# Patient Record
Sex: FEMALE | Race: WHITE | NOT HISPANIC OR LATINO | Employment: OTHER | URBAN - NONMETROPOLITAN AREA
[De-identification: names, ages, dates, MRNs, and addresses within clinical notes are randomized per-mention and may not be internally consistent; named-entity substitution may affect disease eponyms.]

---

## 2017-01-11 ENCOUNTER — CLINICAL SUPPORT (OUTPATIENT)
Dept: FAMILY MEDICINE CLINIC | Facility: CLINIC | Age: 82
End: 2017-01-11

## 2017-01-11 DIAGNOSIS — E53.8 B12 DEFICIENCY: Primary | ICD-10-CM

## 2017-01-11 PROCEDURE — 96372 THER/PROPH/DIAG INJ SC/IM: CPT | Performed by: INTERNAL MEDICINE

## 2017-01-11 RX ADMIN — CYANOCOBALAMIN 1000 MCG: 1000 INJECTION, SOLUTION INTRAMUSCULAR; SUBCUTANEOUS at 14:26

## 2017-01-16 RX ORDER — CYANOCOBALAMIN 1000 UG/ML
1000 INJECTION, SOLUTION INTRAMUSCULAR; SUBCUTANEOUS
Status: SHIPPED | OUTPATIENT
Start: 2017-01-11

## 2017-02-16 ENCOUNTER — CLINICAL SUPPORT (OUTPATIENT)
Dept: CARDIOLOGY | Facility: CLINIC | Age: 82
End: 2017-02-16

## 2017-02-16 VITALS
HEART RATE: 89 BPM | BODY MASS INDEX: 27.28 KG/M2 | SYSTOLIC BLOOD PRESSURE: 138 MMHG | DIASTOLIC BLOOD PRESSURE: 72 MMHG | OXYGEN SATURATION: 99 % | WEIGHT: 169 LBS

## 2017-02-16 DIAGNOSIS — Z95.0 PRESENCE OF PERMANENT CARDIAC PACEMAKER: ICD-10-CM

## 2017-02-16 DIAGNOSIS — I49.5 SICK SINUS SYNDROME (HCC): Primary | ICD-10-CM

## 2017-02-16 PROCEDURE — 93288 INTERROG EVL PM/LDLS PM IP: CPT | Performed by: NURSE PRACTITIONER

## 2017-02-16 NOTE — PROGRESS NOTES
Pacemaker Evaluation Report    February 16, 2017    Primary Cardiologist: Lesly Enriquez MD  Implanting MD: Lesly Enriquez MD  :ThinkSmart Model: SESR01 Serial Number: [id number]  Implant date: 05/26/2015    Reason for evaluation:routine  Cardiac device indication(s):sinus node dysfunction    Battery  JESSE: 7 - 10 years    Interrogation Results  Ventricular sensing: R wave: 8.0 - 16.0 mV  Ventricular capture: 0.5 V @ 0.4 ms  Ventricular lead impedance: right  621 ohms    Parameters  Mode: VVIR  Base Rate: 60 bpm    Diagnostic Data  Ventricular paced: 74.3 %    Changes made: N/A  Conclusions: normal pacemaker function    Assessment:  1. Sick sinus syndrome    2. Presence of permanent cardiac pacemaker

## 2017-03-06 DIAGNOSIS — Z12.31 VISIT FOR SCREENING MAMMOGRAM: Primary | ICD-10-CM

## 2017-03-07 RX ORDER — MELOXICAM 7.5 MG/1
15 TABLET ORAL DAILY
Qty: 60 TABLET | Refills: 5 | Status: SHIPPED | OUTPATIENT
Start: 2017-03-07 | End: 2017-09-19 | Stop reason: SDUPTHER

## 2017-03-07 RX ORDER — SERTRALINE HYDROCHLORIDE 100 MG/1
100 TABLET, FILM COATED ORAL DAILY
Qty: 30 TABLET | Refills: 5 | Status: SHIPPED | OUTPATIENT
Start: 2017-03-07 | End: 2017-09-19 | Stop reason: SDUPTHER

## 2017-03-07 RX ORDER — RANITIDINE 150 MG/1
150 CAPSULE ORAL 2 TIMES DAILY
Qty: 60 CAPSULE | Refills: 5 | Status: SHIPPED | OUTPATIENT
Start: 2017-03-07 | End: 2017-09-19 | Stop reason: SDUPTHER

## 2017-03-07 RX ORDER — PRAVASTATIN SODIUM 40 MG
40 TABLET ORAL DAILY
Qty: 30 TABLET | Refills: 4 | Status: SHIPPED | OUTPATIENT
Start: 2017-03-07 | End: 2017-09-19 | Stop reason: SDUPTHER

## 2017-03-07 RX ORDER — CARVEDILOL 25 MG/1
25 TABLET ORAL 2 TIMES DAILY WITH MEALS
Qty: 60 TABLET | Refills: 5 | Status: SHIPPED | OUTPATIENT
Start: 2017-03-07 | End: 2017-09-19 | Stop reason: SDUPTHER

## 2017-03-07 RX ORDER — LISINOPRIL 40 MG/1
40 TABLET ORAL DAILY
Qty: 30 TABLET | Refills: 5 | Status: SHIPPED | OUTPATIENT
Start: 2017-03-07 | End: 2017-09-19 | Stop reason: SDUPTHER

## 2017-03-07 RX ORDER — AMLODIPINE BESYLATE 5 MG/1
5 TABLET ORAL DAILY
Qty: 30 TABLET | Refills: 5 | Status: SHIPPED | OUTPATIENT
Start: 2017-03-07 | End: 2017-09-19 | Stop reason: SDUPTHER

## 2017-08-25 ENCOUNTER — CLINICAL SUPPORT (OUTPATIENT)
Dept: CARDIOLOGY | Facility: CLINIC | Age: 82
End: 2017-08-25

## 2017-08-25 VITALS
SYSTOLIC BLOOD PRESSURE: 140 MMHG | WEIGHT: 162 LBS | HEART RATE: 88 BPM | BODY MASS INDEX: 29.81 KG/M2 | OXYGEN SATURATION: 98 % | DIASTOLIC BLOOD PRESSURE: 76 MMHG | HEIGHT: 62 IN

## 2017-08-25 DIAGNOSIS — I49.5 SICK SINUS SYNDROME (HCC): Primary | ICD-10-CM

## 2017-08-25 PROCEDURE — 93288 INTERROG EVL PM/LDLS PM IP: CPT | Performed by: INTERNAL MEDICINE

## 2017-09-19 ENCOUNTER — CLINICAL SUPPORT (OUTPATIENT)
Dept: FAMILY MEDICINE CLINIC | Facility: CLINIC | Age: 82
End: 2017-09-19

## 2017-09-19 DIAGNOSIS — Z23 FLU VACCINE NEED: ICD-10-CM

## 2017-09-19 DIAGNOSIS — E53.8 B12 DEFICIENCY: Primary | ICD-10-CM

## 2017-09-19 PROCEDURE — G0008 ADMIN INFLUENZA VIRUS VAC: HCPCS | Performed by: INTERNAL MEDICINE

## 2017-09-19 PROCEDURE — 90662 IIV NO PRSV INCREASED AG IM: CPT | Performed by: INTERNAL MEDICINE

## 2017-09-19 PROCEDURE — 96372 THER/PROPH/DIAG INJ SC/IM: CPT | Performed by: INTERNAL MEDICINE

## 2017-09-19 RX ORDER — ASPIRIN 81 MG/1
81 TABLET ORAL DAILY
Qty: 30 TABLET | Refills: 5 | Status: SHIPPED | OUTPATIENT
Start: 2017-09-19 | End: 2018-04-21

## 2017-09-19 RX ORDER — AMLODIPINE BESYLATE 5 MG/1
5 TABLET ORAL DAILY
Qty: 30 TABLET | Refills: 5 | Status: SHIPPED | OUTPATIENT
Start: 2017-09-19 | End: 2018-04-21

## 2017-09-19 RX ORDER — LISINOPRIL 40 MG/1
40 TABLET ORAL DAILY
Qty: 30 TABLET | Refills: 5 | Status: SHIPPED | OUTPATIENT
Start: 2017-09-19 | End: 2018-04-21

## 2017-09-19 RX ORDER — PRAVASTATIN SODIUM 40 MG
40 TABLET ORAL DAILY
Qty: 30 TABLET | Refills: 5 | Status: SHIPPED | OUTPATIENT
Start: 2017-09-19 | End: 2018-04-21

## 2017-09-19 RX ORDER — MELOXICAM 7.5 MG/1
15 TABLET ORAL DAILY
Qty: 60 TABLET | Refills: 5 | Status: SHIPPED | OUTPATIENT
Start: 2017-09-19 | End: 2018-04-21

## 2017-09-19 RX ORDER — RANITIDINE 150 MG/1
150 CAPSULE ORAL 2 TIMES DAILY
Qty: 60 CAPSULE | Refills: 5 | Status: SHIPPED | OUTPATIENT
Start: 2017-09-19 | End: 2018-04-21

## 2017-09-19 RX ORDER — SERTRALINE HYDROCHLORIDE 100 MG/1
100 TABLET, FILM COATED ORAL DAILY
Qty: 30 TABLET | Refills: 5 | Status: SHIPPED | OUTPATIENT
Start: 2017-09-19 | End: 2018-04-21

## 2017-09-19 RX ORDER — CARVEDILOL 25 MG/1
25 TABLET ORAL 2 TIMES DAILY WITH MEALS
Qty: 60 TABLET | Refills: 5 | Status: SHIPPED | OUTPATIENT
Start: 2017-09-19 | End: 2018-04-21

## 2017-09-19 RX ORDER — ERGOCALCIFEROL 1.25 MG/1
50000 CAPSULE ORAL
Qty: 8 CAPSULE | Refills: 1 | Status: SHIPPED | OUTPATIENT
Start: 2017-09-19 | End: 2018-04-21

## 2017-09-27 RX ADMIN — CYANOCOBALAMIN 1000 MCG: 1000 INJECTION, SOLUTION INTRAMUSCULAR; SUBCUTANEOUS at 16:20

## 2017-09-28 ENCOUNTER — OFFICE VISIT (OUTPATIENT)
Dept: FAMILY MEDICINE CLINIC | Facility: CLINIC | Age: 82
End: 2017-09-28

## 2017-09-28 VITALS
WEIGHT: 162 LBS | OXYGEN SATURATION: 98 % | SYSTOLIC BLOOD PRESSURE: 136 MMHG | HEIGHT: 62 IN | DIASTOLIC BLOOD PRESSURE: 80 MMHG | BODY MASS INDEX: 29.81 KG/M2 | HEART RATE: 76 BPM

## 2017-09-28 DIAGNOSIS — K21.00 REFLUX ESOPHAGITIS: Chronic | ICD-10-CM

## 2017-09-28 DIAGNOSIS — E11.9 TYPE 2 DIABETES MELLITUS WITHOUT COMPLICATION, WITHOUT LONG-TERM CURRENT USE OF INSULIN (HCC): Chronic | ICD-10-CM

## 2017-09-28 DIAGNOSIS — F32.A DEPRESSION, UNSPECIFIED DEPRESSION TYPE: Chronic | ICD-10-CM

## 2017-09-28 DIAGNOSIS — B02.9 HERPES ZOSTER WITHOUT COMPLICATION: Primary | ICD-10-CM

## 2017-09-28 DIAGNOSIS — M15.9 PRIMARY OSTEOARTHRITIS INVOLVING MULTIPLE JOINTS: Chronic | ICD-10-CM

## 2017-09-28 DIAGNOSIS — I10 ESSENTIAL HYPERTENSION: Chronic | ICD-10-CM

## 2017-09-28 PROCEDURE — 99214 OFFICE O/P EST MOD 30 MIN: CPT | Performed by: INTERNAL MEDICINE

## 2017-09-28 RX ORDER — DIPHENHYDRAMINE HCL 25 MG
25 CAPSULE ORAL NIGHTLY PRN
Qty: 30 CAPSULE | Refills: 5 | Status: SHIPPED | OUTPATIENT
Start: 2017-09-28 | End: 2018-05-14

## 2017-09-28 RX ORDER — FAMCICLOVIR 500 MG/1
500 TABLET ORAL 3 TIMES DAILY
Qty: 21 TABLET | Refills: 0 | Status: SHIPPED | OUTPATIENT
Start: 2017-09-28 | End: 2017-10-16 | Stop reason: SDUPTHER

## 2017-09-29 ENCOUNTER — LAB (OUTPATIENT)
Dept: LAB | Facility: OTHER | Age: 82
End: 2017-09-29

## 2017-09-29 DIAGNOSIS — I10 ESSENTIAL HYPERTENSION: ICD-10-CM

## 2017-09-29 DIAGNOSIS — E11.9 TYPE 2 DIABETES MELLITUS WITHOUT COMPLICATION, WITHOUT LONG-TERM CURRENT USE OF INSULIN (HCC): Chronic | ICD-10-CM

## 2017-09-29 LAB
ALBUMIN SERPL-MCNC: 4.3 G/DL (ref 3.2–5.5)
ALBUMIN UR-MCNC: 1.4 MG/L
ALBUMIN/GLOB SERPL: 1.4 G/DL (ref 1–3)
ALP SERPL-CCNC: 56 U/L (ref 15–121)
ALT SERPL W P-5'-P-CCNC: 17 U/L (ref 10–60)
ANION GAP SERPL CALCULATED.3IONS-SCNC: 11 MMOL/L (ref 5–15)
AST SERPL-CCNC: 21 U/L (ref 10–60)
BASOPHILS # BLD AUTO: 0.01 10*3/MM3 (ref 0–0.2)
BASOPHILS NFR BLD AUTO: 0.3 % (ref 0–2)
BILIRUB SERPL-MCNC: 0.9 MG/DL (ref 0.2–1)
BUN BLD-MCNC: 29 MG/DL (ref 8–25)
BUN/CREAT SERPL: 26.4 (ref 7–25)
CALCIUM SPEC-SCNC: 10.2 MG/DL (ref 8.4–10.8)
CHLORIDE SERPL-SCNC: 104 MMOL/L (ref 100–112)
CHOLEST SERPL-MCNC: 183 MG/DL (ref 150–200)
CO2 SERPL-SCNC: 25 MMOL/L (ref 20–32)
CREAT BLD-MCNC: 1.1 MG/DL (ref 0.4–1.3)
DEPRECATED RDW RBC AUTO: 46.4 FL (ref 36.4–46.3)
EOSINOPHIL # BLD AUTO: 0.07 10*3/MM3 (ref 0–0.7)
EOSINOPHIL NFR BLD AUTO: 1.8 % (ref 0–7)
ERYTHROCYTE [DISTWIDTH] IN BLOOD BY AUTOMATED COUNT: 14.2 % (ref 11.5–14.5)
GFR SERPL CREATININE-BSD FRML MDRD: 47 ML/MIN/1.73 (ref 39–90)
GLOBULIN UR ELPH-MCNC: 3 GM/DL (ref 2.5–4.6)
GLUCOSE BLD-MCNC: 113 MG/DL (ref 70–100)
HBA1C MFR BLD: 6.3 % (ref 4–5.6)
HCT VFR BLD AUTO: 35.5 % (ref 35–45)
HDLC SERPL-MCNC: 67 MG/DL (ref 35–100)
HGB BLD-MCNC: 11.3 G/DL (ref 12–15.5)
LDLC SERPL CALC-MCNC: 102 MG/DL
LDLC/HDLC SERPL: 1.52 {RATIO}
LYMPHOCYTES # BLD AUTO: 1.59 10*3/MM3 (ref 0.6–4.2)
LYMPHOCYTES NFR BLD AUTO: 39.8 % (ref 10–50)
MCH RBC QN AUTO: 29.7 PG (ref 26.5–34)
MCHC RBC AUTO-ENTMCNC: 31.8 G/DL (ref 31.4–36)
MCV RBC AUTO: 93.4 FL (ref 80–98)
MONOCYTES # BLD AUTO: 0.38 10*3/MM3 (ref 0–0.9)
MONOCYTES NFR BLD AUTO: 9.5 % (ref 0–12)
NEUTROPHILS # BLD AUTO: 1.95 10*3/MM3 (ref 2–8.6)
NEUTROPHILS NFR BLD AUTO: 48.6 % (ref 37–80)
PLATELET # BLD AUTO: 121 10*3/MM3 (ref 150–450)
PMV BLD AUTO: 11.2 FL (ref 8–12)
POTASSIUM BLD-SCNC: 4.8 MMOL/L (ref 3.4–5.4)
PROT SERPL-MCNC: 7.3 G/DL (ref 6.7–8.2)
PROT UR-MCNC: 5.9 MG/DL
RBC # BLD AUTO: 3.8 10*6/MM3 (ref 3.77–5.16)
SODIUM BLD-SCNC: 140 MMOL/L (ref 134–146)
T4 SERPL-MCNC: 7.71 MCG/DL (ref 5.5–11)
TRIGL SERPL-MCNC: 72 MG/DL (ref 35–160)
TSH SERPL DL<=0.05 MIU/L-ACNC: 1.44 MIU/ML (ref 0.46–4.68)
VLDLC SERPL-MCNC: 14.4 MG/DL
WBC NRBC COR # BLD: 4 10*3/MM3 (ref 3.2–9.8)

## 2017-09-29 PROCEDURE — 85025 COMPLETE CBC W/AUTO DIFF WBC: CPT | Performed by: INTERNAL MEDICINE

## 2017-09-29 PROCEDURE — 82043 UR ALBUMIN QUANTITATIVE: CPT | Performed by: INTERNAL MEDICINE

## 2017-09-29 PROCEDURE — 84443 ASSAY THYROID STIM HORMONE: CPT | Performed by: INTERNAL MEDICINE

## 2017-09-29 PROCEDURE — 84156 ASSAY OF PROTEIN URINE: CPT | Performed by: INTERNAL MEDICINE

## 2017-09-29 PROCEDURE — 84436 ASSAY OF TOTAL THYROXINE: CPT | Performed by: INTERNAL MEDICINE

## 2017-09-29 PROCEDURE — 80061 LIPID PANEL: CPT | Performed by: INTERNAL MEDICINE

## 2017-09-29 PROCEDURE — 83036 HEMOGLOBIN GLYCOSYLATED A1C: CPT | Performed by: INTERNAL MEDICINE

## 2017-09-29 PROCEDURE — 80053 COMPREHEN METABOLIC PANEL: CPT | Performed by: INTERNAL MEDICINE

## 2017-10-10 DIAGNOSIS — D64.9 LOW HEMOGLOBIN: Primary | ICD-10-CM

## 2017-10-10 NOTE — TELEPHONE ENCOUNTER
Call placed to patient regarding lab results unable to reach patient mailed letter to call for any questions along with mds order of an anemia work up orders put in place

## 2017-10-16 ENCOUNTER — OFFICE VISIT (OUTPATIENT)
Dept: FAMILY MEDICINE CLINIC | Facility: CLINIC | Age: 82
End: 2017-10-16

## 2017-10-16 VITALS
RESPIRATION RATE: 16 BRPM | DIASTOLIC BLOOD PRESSURE: 86 MMHG | HEIGHT: 62 IN | HEART RATE: 83 BPM | WEIGHT: 162 LBS | OXYGEN SATURATION: 99 % | SYSTOLIC BLOOD PRESSURE: 124 MMHG | BODY MASS INDEX: 29.81 KG/M2

## 2017-10-16 DIAGNOSIS — Z23 NEED FOR SHINGLES VACCINE: ICD-10-CM

## 2017-10-16 DIAGNOSIS — B02.9 HERPES ZOSTER WITHOUT COMPLICATION: Primary | ICD-10-CM

## 2017-10-16 PROCEDURE — 99214 OFFICE O/P EST MOD 30 MIN: CPT | Performed by: NURSE PRACTITIONER

## 2017-10-16 RX ORDER — FAMCICLOVIR 500 MG/1
500 TABLET ORAL 3 TIMES DAILY
Qty: 21 TABLET | Refills: 0 | Status: SHIPPED | OUTPATIENT
Start: 2017-10-16 | End: 2018-02-27 | Stop reason: SDUPTHER

## 2017-10-16 RX ORDER — METHYLPREDNISOLONE 4 MG/1
TABLET ORAL
Qty: 1 EACH | Refills: 0 | Status: SHIPPED | OUTPATIENT
Start: 2017-10-16 | End: 2018-02-27

## 2017-10-16 NOTE — PROGRESS NOTES
"Subjective   Any Dozier is a 83 y.o. female who presents to the office F/U on Shingles.   Herpes Zoster   This is a new (had for longer than two weeks, seen Dr. Shi on 9/28 for it and was prescribed famvir 500mg TID x 7 days) problem. The current episode started 1 to 4 weeks ago. The problem occurs constantly. The problem has been gradually improving. Associated symptoms include a rash. Pertinent negatives include no chest pain, fever, numbness or weakness. Associated symptoms comments: Itchy more so at bedtime, starts at left side of lower back and continues in a line up the left side of back to the neck. Patient states \"slowly going away but still somewhat painful and she finished her medication from Dr Shi\". Nothing aggravates the symptoms. Treatments tried: benadryl at bedtime. The treatment provided mild relief.        The following portions of the patient's history were reviewed and updated as appropriate: allergies, current medications, past family history, past medical history, past social history, past surgical history and problem list.    Review of Systems   Constitutional: Negative for activity change and fever.   Respiratory: Negative for chest tightness, shortness of breath and wheezing.    Cardiovascular: Negative for chest pain, palpitations and leg swelling.   Skin: Positive for rash.   Neurological: Negative for dizziness, tremors, syncope, weakness and numbness.       Past Medical History:   Diagnosis Date   • Atrial fibrillation, chronic     CONTROLLED   • Chest pain 11/13/2014   • Diabetes mellitus     TYPE 2   • Dizziness 11/20/2014   • Dyspnea 04/29/2014   • History of echocardiogram 05/26/2015    W/COLOR FLOW No evidence of pericardial effusion. Depressed LV systolic function, Ef of 40-45%.Severe left atrial enlargement.Single chamber pacemaker lead visualized in the right cardiac chamber attached to the interventricular septum   • History of permanent cardiac pacemaker " "placement 07/02/2015   • Hypertension    • Presence of permanent cardiac pacemaker     ACUTE, MODERATE   • Sick sinus syndrome     CHRONIC, CONTROLLED   • Tachycardia-bradycardia     WITH PAUSES, CHRONIC, CONTROLLED       Family History   Problem Relation Age of Onset   • Stroke Other    • Hypertension Other    • Heart disease Other    • Cancer Other    • Hypertension Mother    • Stroke Mother    • Heart attack Father    • Cancer Sister    • Cancer Brother           Objective   /86 (BP Location: Right arm, Patient Position: Sitting, Cuff Size: Adult)  Pulse 83  Resp 16  Ht 62\" (157.5 cm)  Wt 162 lb (73.5 kg)  SpO2 99%  Breastfeeding? No  BMI 29.63 kg/m2  Physical Exam   Constitutional: She appears well-developed and well-nourished. No distress.   Cardiovascular: Normal rate, regular rhythm, normal heart sounds and intact distal pulses.  Exam reveals no gallop and no friction rub.    No murmur heard.  Pulmonary/Chest: Effort normal and breath sounds normal. No respiratory distress. She has no wheezes. She has no rales.   Neurological: She has normal strength. No sensory deficit.   Skin: Rash noted.        Psychiatric: She has a normal mood and affect. Her behavior is normal.   Nursing note and vitals reviewed.       PHQ-2/PHQ-9 Depression Screening 10/16/2017   Little interest or pleasure in doing things 0   Feeling down, depressed, or hopeless 0   Total Score 0         Assessment/Plan   Any was seen today for herpes zoster.    Diagnoses and all orders for this visit:    Herpes zoster without complication  -     famciclovir (FAMVIR) 500 MG tablet; Take 1 tablet by mouth 3 (Three) Times a Day.  -     MethylPREDNISolone (MEDROL, ED,) 4 MG tablet; Take as directed on package instructions.    Need for shingles vaccine  -     zoster vaccine live 98737 UNT/0.65ML reconstituted suspension; Inject 19,400 Units under the skin 1 (One) Time for 1 dose.    Patient has shingles, prescribed second regimen of " famciclovir for 7 days cough along with Medrol Dosepak.  Patient is a diabetic but maintains her blood sugar well, educated patient on slight rise in blood sugar over the next couple of days while she is on steroid pack and to monitor carefully.  Patient can continue to take Benadryl at nighttime for itchiness.  Patient educated not to scratch area or apply any ointment to the area.  Patient has not had shingles vaccination yet, wrote prescription with specific instructions to not get shingles vaccination until after mid-November.  Patient educated to follow up if condition does not get better or gets worse.  Patient in understanding and agreement with plan of care. An After Visit Summary was printed and given to the patient.      Current Outpatient Prescriptions:   •  amLODIPine (NORVASC) 5 MG tablet, Take 1 tablet by mouth Daily., Disp: 30 tablet, Rfl: 5  •  aspirin 81 MG EC tablet, Take 1 tablet by mouth Daily., Disp: 30 tablet, Rfl: 5  •  carvedilol (COREG) 25 MG tablet, Take 1 tablet by mouth 2 (Two) Times a Day With Meals., Disp: 60 tablet, Rfl: 5  •  diphenhydrAMINE (BENADRYL) 25 mg capsule, Take 1 capsule by mouth At Night As Needed for Sleep., Disp: 30 capsule, Rfl: 5  •  lisinopril (PRINIVIL,ZESTRIL) 40 MG tablet, Take 1 tablet by mouth Daily., Disp: 30 tablet, Rfl: 5  •  meloxicam (MOBIC) 7.5 MG tablet, Take 2 tablets by mouth Daily., Disp: 60 tablet, Rfl: 5  •  metFORMIN (GLUCOPHAGE) 1000 MG tablet, Take 1 tablet by mouth 2 (Two) Times a Day With Meals., Disp: 60 tablet, Rfl: 5  •  pravastatin (PRAVACHOL) 40 MG tablet, Take 1 tablet by mouth Daily., Disp: 30 tablet, Rfl: 5  •  ranitidine (ZANTAC) 150 MG capsule, Take 1 capsule by mouth 2 (Two) Times a Day., Disp: 60 capsule, Rfl: 5  •  sertraline (ZOLOFT) 100 MG tablet, Take 1 tablet by mouth Daily., Disp: 30 tablet, Rfl: 5  •  vitamin D (ERGOCALCIFEROL) 44637 units capsule capsule, Take 1 capsule by mouth Every 14 (Fourteen) Days., Disp: 8 capsule, Rfl:  1  •  famciclovir (FAMVIR) 500 MG tablet, Take 1 tablet by mouth 3 (Three) Times a Day., Disp: 21 tablet, Rfl: 0  •  MethylPREDNISolone (MEDROL, ED,) 4 MG tablet, Take as directed on package instructions., Disp: 1 each, Rfl: 0  •  zoster vaccine live 55195 UNT/0.65ML reconstituted suspension, Inject 19,400 Units under the skin 1 (One) Time for 1 dose., Disp: 1 each, Rfl: 0    Current Facility-Administered Medications:   •  cyanocobalamin injection 1,000 mcg, 1,000 mcg, Intramuscular, Q28 Days, Cole Shi MD, 1,000 mcg at 09/27/17 1620      JASPER Becerra        This document has been electronically signed by JASPER Becerra on October 16, 2017 2:55 PM

## 2017-10-24 ENCOUNTER — LAB (OUTPATIENT)
Dept: LAB | Facility: OTHER | Age: 82
End: 2017-10-24

## 2017-10-24 DIAGNOSIS — D64.9 LOW HEMOGLOBIN: ICD-10-CM

## 2017-10-24 LAB
BASOPHILS # BLD AUTO: 0.01 10*3/MM3 (ref 0–0.2)
BASOPHILS NFR BLD AUTO: 0.2 % (ref 0–2)
DEPRECATED RDW RBC AUTO: 46.9 FL (ref 36.4–46.3)
EOSINOPHIL # BLD AUTO: 0.05 10*3/MM3 (ref 0–0.7)
EOSINOPHIL NFR BLD AUTO: 0.8 % (ref 0–7)
ERYTHROCYTE [DISTWIDTH] IN BLOOD BY AUTOMATED COUNT: 14.4 % (ref 11.5–14.5)
HCT VFR BLD AUTO: 38.3 % (ref 35–45)
HGB BLD-MCNC: 12.2 G/DL (ref 12–15.5)
LYMPHOCYTES # BLD AUTO: 2.53 10*3/MM3 (ref 0.6–4.2)
LYMPHOCYTES NFR BLD AUTO: 39.5 % (ref 10–50)
MCH RBC QN AUTO: 29.3 PG (ref 26.5–34)
MCHC RBC AUTO-ENTMCNC: 31.9 G/DL (ref 31.4–36)
MCV RBC AUTO: 92.1 FL (ref 80–98)
MONOCYTES # BLD AUTO: 0.62 10*3/MM3 (ref 0–0.9)
MONOCYTES NFR BLD AUTO: 9.7 % (ref 0–12)
NEUTROPHILS # BLD AUTO: 3.19 10*3/MM3 (ref 2–8.6)
NEUTROPHILS NFR BLD AUTO: 49.8 % (ref 37–80)
PLATELET # BLD AUTO: 120 10*3/MM3 (ref 150–450)
PMV BLD AUTO: 11.8 FL (ref 8–12)
RBC # BLD AUTO: 4.16 10*6/MM3 (ref 3.77–5.16)
WBC NRBC COR # BLD: 6.4 10*3/MM3 (ref 3.2–9.8)

## 2017-10-24 PROCEDURE — 82607 VITAMIN B-12: CPT | Performed by: INTERNAL MEDICINE

## 2017-10-24 PROCEDURE — 82728 ASSAY OF FERRITIN: CPT | Performed by: INTERNAL MEDICINE

## 2017-10-24 PROCEDURE — 85025 COMPLETE CBC W/AUTO DIFF WBC: CPT | Performed by: INTERNAL MEDICINE

## 2017-10-25 LAB
FERRITIN SERPL-MCNC: 29.6 NG/ML (ref 11.1–264)
VIT B12 BLD-MCNC: 393 PG/ML (ref 239–931)

## 2017-10-26 LAB
COLLECT DATE SP2 STL: ABNORMAL
COLLECT DATE SP3 STL: ABNORMAL
COLLECT DATE STL: ABNORMAL
HEMOCCULT STL QL: POSITIVE
Lab: ABNORMAL

## 2017-10-26 PROCEDURE — 82270 OCCULT BLOOD FECES: CPT | Performed by: INTERNAL MEDICINE

## 2017-11-03 ENCOUNTER — TELEPHONE (OUTPATIENT)
Dept: FAMILY MEDICINE CLINIC | Facility: CLINIC | Age: 82
End: 2017-11-03

## 2017-11-03 DIAGNOSIS — K92.1 BLOOD IN STOOL: Primary | ICD-10-CM

## 2017-11-03 NOTE — TELEPHONE ENCOUNTER
Call placed to patient regarding lab results and mds orders of colonoscopy and egd patient voiced understanding referral put in place

## 2017-12-07 ENCOUNTER — CONSULT (OUTPATIENT)
Dept: SURGERY | Facility: CLINIC | Age: 82
End: 2017-12-07

## 2017-12-07 VITALS
HEIGHT: 62 IN | WEIGHT: 150 LBS | BODY MASS INDEX: 27.6 KG/M2 | SYSTOLIC BLOOD PRESSURE: 132 MMHG | DIASTOLIC BLOOD PRESSURE: 70 MMHG

## 2017-12-07 DIAGNOSIS — R19.5 OCCULT BLOOD IN STOOLS: Primary | ICD-10-CM

## 2017-12-07 PROCEDURE — 99203 OFFICE O/P NEW LOW 30 MIN: CPT | Performed by: SURGERY

## 2017-12-07 RX ORDER — RANITIDINE 150 MG/1
TABLET ORAL
COMMUNITY
Start: 2017-11-27 | End: 2018-02-27 | Stop reason: SDUPTHER

## 2018-02-10 NOTE — PROGRESS NOTES
CHIEF COMPLAINT:    Evaluate for upper and lower endoscopy due to Hemoccult-positive stool    HISTORY OF PRESENT ILLNESS:    Any Dozier is a 83 y.o. female who is referred to discuss upper and lower endoscopy as she recently had Hemoccult positive stool.  She has noted no wilian blood in her stool.  She has no abdominal pain and no abdominal complaints.  However in October she was noted to have 3 Hemoccult positive tests.  Around the same time her hemoglobin was 12.    Past Medical History:   Diagnosis Date   • Atrial fibrillation, chronic     CONTROLLED   • Chest pain 11/13/2014   • Diabetes mellitus     TYPE 2   • Dizziness 11/20/2014   • Dyspnea 04/29/2014   • History of echocardiogram 05/26/2015    W/COLOR FLOW No evidence of pericardial effusion. Depressed LV systolic function, Ef of 40-45%.Severe left atrial enlargement.Single chamber pacemaker lead visualized in the right cardiac chamber attached to the interventricular septum   • History of permanent cardiac pacemaker placement 07/02/2015   • Hypertension    • Presence of permanent cardiac pacemaker     ACUTE, MODERATE   • Sick sinus syndrome     CHRONIC, CONTROLLED   • Tachycardia-bradycardia     WITH PAUSES, CHRONIC, CONTROLLED       Past Surgical History:   Procedure Laterality Date   • APPENDECTOMY     • CHOLECYSTECTOMY     • COLON SURGERY     • HYSTERECTOMY     • JOINT REPLACEMENT      KNEE       Prior to Admission medications    Medication Sig Start Date End Date Taking? Authorizing Provider   amLODIPine (NORVASC) 5 MG tablet Take 1 tablet by mouth Daily. 9/19/17  Yes Cole Shi MD   aspirin 81 MG EC tablet Take 1 tablet by mouth Daily. 9/19/17  Yes Cole Shi MD   carvedilol (COREG) 25 MG tablet Take 1 tablet by mouth 2 (Two) Times a Day With Meals. 9/19/17  Yes Cole Shi MD   diphenhydrAMINE (BENADRYL) 25 mg capsule Take 1 capsule by mouth At Night As Needed for Sleep. 9/28/17  Yes Cole Shi MD   famciclovir  (FAMVIR) 500 MG tablet Take 1 tablet by mouth 3 (Three) Times a Day. 10/16/17  Yes JASPER Becerra   lisinopril (PRINIVIL,ZESTRIL) 40 MG tablet Take 1 tablet by mouth Daily. 9/19/17  Yes Cole Shi MD   meloxicam (MOBIC) 7.5 MG tablet Take 2 tablets by mouth Daily. 9/19/17  Yes Cole Shi MD   metFORMIN (GLUCOPHAGE) 1000 MG tablet Take 1 tablet by mouth 2 (Two) Times a Day With Meals. 9/19/17  Yes Cole Shi MD   MethylPREDNISolone (MEDROL, ED,) 4 MG tablet Take as directed on package instructions. 10/16/17  Yes JASPER Becerra   pravastatin (PRAVACHOL) 40 MG tablet Take 1 tablet by mouth Daily. 9/19/17  Yes Cole Shi MD   ranitidine (ZANTAC) 150 MG capsule Take 1 capsule by mouth 2 (Two) Times a Day. 9/19/17 9/19/18 Yes Cole Shi MD   raNITIdine (ZANTAC) 150 MG tablet  11/27/17  Yes Jovanny Zavala MD   sertraline (ZOLOFT) 100 MG tablet Take 1 tablet by mouth Daily. 9/19/17  Yes Cole Shi MD   vitamin D (ERGOCALCIFEROL) 64669 units capsule capsule Take 1 capsule by mouth Every 14 (Fourteen) Days. 9/19/17  Yes Cole Shi MD       Allergies   Allergen Reactions   • Codeine    • Morphine And Related    • Penicillins    • Sulfa Antibiotics        Family History   Problem Relation Age of Onset   • Stroke Other    • Hypertension Other    • Heart disease Other    • Cancer Other    • Hypertension Mother    • Stroke Mother    • Heart attack Father    • Cancer Sister    • Cancer Brother        Social History     Social History   • Marital status:      Spouse name: N/A   • Number of children: N/A   • Years of education: N/A     Occupational History   • Not on file.     Social History Main Topics   • Smoking status: Never Smoker   • Smokeless tobacco: Never Used   • Alcohol use No   • Drug use: No   • Sexual activity: Defer     Other Topics Concern   • Not on file     Social History Narrative       Review of Systems   Constitutional: Negative for  "appetite change, chills, fever and unexpected weight change.   HENT: Negative for hearing loss, nosebleeds and trouble swallowing.    Eyes: Negative for visual disturbance.   Respiratory: Negative for apnea, cough, choking, chest tightness, shortness of breath, wheezing and stridor.    Cardiovascular: Negative for chest pain, palpitations and leg swelling.   Gastrointestinal: Negative for abdominal distention, abdominal pain, blood in stool, constipation, diarrhea, nausea and vomiting.   Endocrine: Negative for cold intolerance, heat intolerance, polydipsia, polyphagia and polyuria.   Genitourinary: Negative for difficulty urinating, dysuria, frequency, hematuria and urgency.   Musculoskeletal: Negative for arthralgias, back pain, myalgias and neck pain.   Skin: Negative for color change, pallor and rash.   Allergic/Immunologic: Negative for immunocompromised state.   Neurological: Negative for dizziness, seizures, syncope, light-headedness, numbness and headaches.   Hematological: Negative for adenopathy.   Psychiatric/Behavioral: Negative for suicidal ideas. The patient is not nervous/anxious.        Objective     /70  Ht 157.5 cm (62\")  Wt 68 kg (150 lb)  BMI 27.44 kg/m2    Physical Exam   Constitutional: She is oriented to person, place, and time. She appears well-developed and well-nourished. No distress.   HENT:   Head: Normocephalic and atraumatic.   Eyes: Conjunctivae and EOM are normal. Pupils are equal, round, and reactive to light. Right eye exhibits no discharge. Left eye exhibits no discharge.   Neck: Normal range of motion. Neck supple. No JVD present. No tracheal deviation present. No thyromegaly present.   Cardiovascular: Normal rate, regular rhythm and normal heart sounds.  Exam reveals no gallop and no friction rub.    No murmur heard.  Pulmonary/Chest: Effort normal and breath sounds normal. No respiratory distress. She has no wheezes. She has no rales. She exhibits no tenderness. "   Abdominal: Soft. She exhibits no distension and no mass. There is no tenderness. There is no rebound and no guarding. No hernia.   Musculoskeletal: Normal range of motion. She exhibits no edema, tenderness or deformity.   Neurological: She is alert and oriented to person, place, and time. No cranial nerve deficit.   Skin: Skin is warm and dry. No rash noted. She is not diaphoretic. No erythema. No pallor.   Psychiatric: She has a normal mood and affect. Her behavior is normal. Judgment and thought content normal.       DIAGNOSTIC DATA:    Hemoglobin 12.2 in October 2017    Hemoccult positive ×3    ASSESSMENT:    Occult blood in stool    PLAN:    I discussed the positive Hemoccult test with the patient today.  I recommended that she undergo upper and lower endoscopies for evaluation of sources of occult bleeding.  Currently given her age she does not feel that she would like to undergo endoscopies.  I will see her as needed.          This document has been electronically signed by Eduardo Omalley MD on February 10, 2018 5:02 PM

## 2018-02-27 ENCOUNTER — OFFICE VISIT (OUTPATIENT)
Dept: FAMILY MEDICINE CLINIC | Facility: CLINIC | Age: 83
End: 2018-02-27

## 2018-02-27 VITALS
SYSTOLIC BLOOD PRESSURE: 128 MMHG | OXYGEN SATURATION: 99 % | TEMPERATURE: 97.9 F | RESPIRATION RATE: 16 BRPM | HEART RATE: 83 BPM | BODY MASS INDEX: 27.6 KG/M2 | WEIGHT: 150 LBS | HEIGHT: 62 IN | DIASTOLIC BLOOD PRESSURE: 70 MMHG

## 2018-02-27 DIAGNOSIS — B02.9 HERPES ZOSTER WITHOUT COMPLICATION: ICD-10-CM

## 2018-02-27 PROCEDURE — 99213 OFFICE O/P EST LOW 20 MIN: CPT | Performed by: NURSE PRACTITIONER

## 2018-02-27 RX ORDER — FAMCICLOVIR 500 MG/1
500 TABLET ORAL 3 TIMES DAILY
Qty: 30 TABLET | Refills: 0 | Status: SHIPPED | OUTPATIENT
Start: 2018-02-27 | End: 2018-03-09

## 2018-02-27 RX ORDER — HYDROXYZINE HYDROCHLORIDE 10 MG/1
10 TABLET, FILM COATED ORAL 3 TIMES DAILY PRN
Qty: 30 TABLET | Refills: 0 | Status: SHIPPED | OUTPATIENT
Start: 2018-02-27 | End: 2018-04-21

## 2018-02-27 RX ORDER — PREDNISONE 20 MG/1
TABLET ORAL
Qty: 12 TABLET | Refills: 0 | Status: SHIPPED | OUTPATIENT
Start: 2018-02-27 | End: 2018-04-21

## 2018-03-02 NOTE — PROGRESS NOTES
Subjective   Any Dozier is a 83 y.o. female who presents to the office complaining of recurring rash.    History of Present Illness     Rash: Patient states this is re-current.  Was last seen for it on 10/16/17 by Dr. Cole Shi, diagnosed as shingles on the back, prescribed Medrol Dosepak and famciclovir.  In the note it does show that patient was prescribed a shingles vaccination, patient states that she does not remember this prescription and she has not gotten a vaccination.  Patient states that she did take all of the medication prescribed by Dr. Shi from her last visit and that it was two prescriptions.  Patient states it got better on her back to some extent but then spread to her face.  Patient states that she has had on her face for about 3-4 weeks, it is around her mouth, the right side of her upper back, and her upper chest.  Patient denies any possible bug bites or anyone else around her such as family who has had the same type of rash.  Patient states it itches and burns most of the time.  Patient states that they are like blisters that open up and drain yellowish type fluid.  Patient denies any fever, body aches, or chills.  Patient states no change in laundry detergent, body soaps, etc.  Patient states she has not been outside unless it's just to travel for one place to another.    The following portions of the patient's history were reviewed and updated as appropriate: allergies, current medications, past family history, past medical history, past social history, past surgical history and problem list.    MAYRA change in review, patient has clean MAYRA.    Review of Systems   Constitutional: Negative for chills, fatigue and fever.   HENT: Negative.    Gastrointestinal: Negative for abdominal pain, diarrhea, nausea and vomiting.   Musculoskeletal: Negative for myalgias.   Skin: Positive for rash. Negative for color change, pallor and wound.       Past Medical History:   Diagnosis  "Date   • Atrial fibrillation, chronic     CONTROLLED   • Chest pain 11/13/2014   • Diabetes mellitus     TYPE 2   • Dizziness 11/20/2014   • Dyspnea 04/29/2014   • History of echocardiogram 05/26/2015    W/COLOR FLOW No evidence of pericardial effusion. Depressed LV systolic function, Ef of 40-45%.Severe left atrial enlargement.Single chamber pacemaker lead visualized in the right cardiac chamber attached to the interventricular septum   • History of permanent cardiac pacemaker placement 07/02/2015   • Hypertension    • Presence of permanent cardiac pacemaker     ACUTE, MODERATE   • Sick sinus syndrome     CHRONIC, CONTROLLED   • Tachycardia-bradycardia     WITH PAUSES, CHRONIC, CONTROLLED       Family History   Problem Relation Age of Onset   • Stroke Other    • Hypertension Other    • Heart disease Other    • Cancer Other    • Hypertension Mother    • Stroke Mother    • Heart attack Father    • Cancer Sister    • Cancer Brother           Objective   /70  Pulse 83  Temp 97.9 °F (36.6 °C) (Tympanic)   Resp 16  Ht 157.5 cm (62\")  Wt 68 kg (150 lb)  SpO2 99%  Breastfeeding? No  BMI 27.44 kg/m2  Physical Exam   Constitutional: She appears well-developed and well-nourished. No distress.   Cardiovascular: Normal rate, regular rhythm, normal heart sounds and intact distal pulses.  Exam reveals no gallop and no friction rub.    No murmur heard.  Pulmonary/Chest: Effort normal and breath sounds normal. No respiratory distress. She has no wheezes. She has no rales.   Skin:        Psychiatric: She has a normal mood and affect. Her behavior is normal.   Nursing note and vitals reviewed.       PHQ-2/PHQ-9 Depression Screening 2/27/2018   Little interest or pleasure in doing things 0   Feeling down, depressed, or hopeless 0   Total Score 0         Assessment/Plan   Any was seen today for rash.    Diagnoses and all orders for this visit:    Herpes zoster without complication  -     famciclovir (FAMVIR) 500 MG " tablet; Take 1 tablet by mouth 3 (Three) Times a Day for 10 days.  -     predniSONE (DELTASONE) 20 MG tablet; Take 2 tabs (40mg) daily x 4 days,then take 1 tab (20mg) daily x 4 days.  -     hydrOXYzine (ATARAX) 10 MG tablet; Take 1 tablet by mouth 3 (Three) Times a Day As Needed for Itching. Can make you sleepy, do not drive with this medication           Recurrent herpes zoster: Prescribed hydroxyzine 3 times a day when necessary for itching, educated patient strongly to be advised that this can make her sleepy she should not drive with this medication.  Prescribed prednisone taper over 8 days, prescribed Famvir ×10 days.  Patient will follow-up with me next week.  Will consider dermatology referral if no improvement.    Patient educated to follow-up sooner than next scheduled appointment if condition(s) worse or do not improve. Patient states understanding and is in agreeance with plan of care. An After Visit Summary was printed and given to the patient.      JASPER Becerra        This document has been electronically signed by JASPER Becerra on March 1, 2018 6:35 PM      EMR/Transcription Dragon Disclaimer:  Some of this note may be an electronic dragon transcription/translation of spoken language to printed text. The electronic translation of spoken language may permit erroneous, or at times, nonsensical words or phrases to be inadvertently transcribed. Although I have reviewed the note for such errors, some may still exist.

## 2018-03-06 ENCOUNTER — OFFICE VISIT (OUTPATIENT)
Dept: FAMILY MEDICINE CLINIC | Facility: CLINIC | Age: 83
End: 2018-03-06

## 2018-03-06 ENCOUNTER — CLINICAL SUPPORT (OUTPATIENT)
Dept: FAMILY MEDICINE CLINIC | Facility: CLINIC | Age: 83
End: 2018-03-06

## 2018-03-06 VITALS
TEMPERATURE: 98 F | SYSTOLIC BLOOD PRESSURE: 140 MMHG | DIASTOLIC BLOOD PRESSURE: 80 MMHG | WEIGHT: 148 LBS | OXYGEN SATURATION: 99 % | HEIGHT: 62 IN | RESPIRATION RATE: 16 BRPM | BODY MASS INDEX: 27.23 KG/M2 | HEART RATE: 81 BPM

## 2018-03-06 DIAGNOSIS — E53.8 B12 DEFICIENCY: Primary | ICD-10-CM

## 2018-03-06 DIAGNOSIS — R63.0 DECREASED APPETITE: ICD-10-CM

## 2018-03-06 DIAGNOSIS — E78.5 HYPERLIPIDEMIA, UNSPECIFIED HYPERLIPIDEMIA TYPE: ICD-10-CM

## 2018-03-06 DIAGNOSIS — E11.9 TYPE 2 DIABETES MELLITUS WITHOUT COMPLICATION, WITHOUT LONG-TERM CURRENT USE OF INSULIN (HCC): Primary | ICD-10-CM

## 2018-03-06 DIAGNOSIS — F32.A DEPRESSION, UNSPECIFIED DEPRESSION TYPE: ICD-10-CM

## 2018-03-06 DIAGNOSIS — K21.9 GASTROESOPHAGEAL REFLUX DISEASE WITHOUT ESOPHAGITIS: ICD-10-CM

## 2018-03-06 DIAGNOSIS — I48.20 CHRONIC ATRIAL FIBRILLATION (HCC): ICD-10-CM

## 2018-03-06 DIAGNOSIS — R53.83 OTHER FATIGUE: ICD-10-CM

## 2018-03-06 DIAGNOSIS — M85.80 OSTEOPENIA, UNSPECIFIED LOCATION: ICD-10-CM

## 2018-03-06 DIAGNOSIS — G47.00 INSOMNIA, UNSPECIFIED TYPE: ICD-10-CM

## 2018-03-06 DIAGNOSIS — I10 ESSENTIAL HYPERTENSION: ICD-10-CM

## 2018-03-06 DIAGNOSIS — R21 RASH: ICD-10-CM

## 2018-03-06 DIAGNOSIS — E53.8 VITAMIN B 12 DEFICIENCY: ICD-10-CM

## 2018-03-06 DIAGNOSIS — E55.9 VITAMIN D DEFICIENCY: ICD-10-CM

## 2018-03-06 PROCEDURE — 99214 OFFICE O/P EST MOD 30 MIN: CPT | Performed by: NURSE PRACTITIONER

## 2018-03-06 PROCEDURE — 96372 THER/PROPH/DIAG INJ SC/IM: CPT | Performed by: INTERNAL MEDICINE

## 2018-03-06 RX ORDER — DOXYCYCLINE 100 MG/1
100 CAPSULE ORAL 2 TIMES DAILY
Qty: 20 CAPSULE | Refills: 0 | Status: SHIPPED | OUTPATIENT
Start: 2018-03-06 | End: 2018-04-21

## 2018-03-06 RX ORDER — MEGESTROL ACETATE 20 MG/1
20 TABLET ORAL DAILY
Qty: 30 TABLET | Refills: 0 | Status: SHIPPED | OUTPATIENT
Start: 2018-03-06 | End: 2018-04-21

## 2018-03-06 RX ADMIN — CYANOCOBALAMIN 1000 MCG: 1000 INJECTION, SOLUTION INTRAMUSCULAR; SUBCUTANEOUS at 11:06

## 2018-03-07 ENCOUNTER — LAB (OUTPATIENT)
Dept: LAB | Facility: HOSPITAL | Age: 83
End: 2018-03-07

## 2018-03-07 ENCOUNTER — CLINICAL SUPPORT (OUTPATIENT)
Dept: CARDIOLOGY | Facility: CLINIC | Age: 83
End: 2018-03-07

## 2018-03-07 DIAGNOSIS — I49.5 SICK SINUS SYNDROME (HCC): Primary | ICD-10-CM

## 2018-03-07 DIAGNOSIS — R53.83 OTHER FATIGUE: ICD-10-CM

## 2018-03-07 DIAGNOSIS — M85.80 OSTEOPENIA, UNSPECIFIED LOCATION: ICD-10-CM

## 2018-03-07 DIAGNOSIS — I10 ESSENTIAL HYPERTENSION: ICD-10-CM

## 2018-03-07 DIAGNOSIS — R63.0 DECREASED APPETITE: ICD-10-CM

## 2018-03-07 DIAGNOSIS — E11.9 TYPE 2 DIABETES MELLITUS WITHOUT COMPLICATION, WITHOUT LONG-TERM CURRENT USE OF INSULIN (HCC): ICD-10-CM

## 2018-03-07 DIAGNOSIS — E78.5 HYPERLIPIDEMIA, UNSPECIFIED HYPERLIPIDEMIA TYPE: ICD-10-CM

## 2018-03-07 DIAGNOSIS — E53.8 VITAMIN B 12 DEFICIENCY: ICD-10-CM

## 2018-03-07 DIAGNOSIS — Z79.899 CURRENT USE OF PROTON PUMP INHIBITOR: ICD-10-CM

## 2018-03-07 DIAGNOSIS — Z95.0 PRESENCE OF PERMANENT CARDIAC PACEMAKER: ICD-10-CM

## 2018-03-07 LAB
25(OH)D3 SERPL-MCNC: 53.3 NG/ML (ref 30–100)
ALBUMIN SERPL-MCNC: 4.1 G/DL (ref 3.4–4.8)
ALBUMIN/GLOB SERPL: 1.4 G/DL (ref 1.1–1.8)
ALP SERPL-CCNC: 67 U/L (ref 38–126)
ALT SERPL W P-5'-P-CCNC: 25 U/L (ref 9–52)
ANION GAP SERPL CALCULATED.3IONS-SCNC: 10 MMOL/L (ref 5–15)
ARTICHOKE IGE QN: 73 MG/DL (ref 1–129)
AST SERPL-CCNC: 16 U/L (ref 14–36)
BASOPHILS # BLD AUTO: 0.01 10*3/MM3 (ref 0–0.2)
BASOPHILS NFR BLD AUTO: 0.2 % (ref 0–2)
BILIRUB SERPL-MCNC: 0.5 MG/DL (ref 0.2–1.3)
BILIRUB UR QL STRIP: ABNORMAL
BUN BLD-MCNC: 28 MG/DL (ref 7–21)
BUN/CREAT SERPL: 23.7 (ref 7–25)
CALCIUM SPEC-SCNC: 10.4 MG/DL (ref 8.4–10.2)
CHLORIDE SERPL-SCNC: 100 MMOL/L (ref 95–110)
CHOLEST SERPL-MCNC: 177 MG/DL (ref 0–199)
CLARITY UR: CLEAR
CO2 SERPL-SCNC: 26 MMOL/L (ref 22–31)
COLOR UR: ABNORMAL
CREAT BLD-MCNC: 1.18 MG/DL (ref 0.5–1)
DEPRECATED RDW RBC AUTO: 45.1 FL (ref 36.4–46.3)
EOSINOPHIL # BLD AUTO: 0.09 10*3/MM3 (ref 0–0.7)
EOSINOPHIL NFR BLD AUTO: 1.4 % (ref 0–7)
ERYTHROCYTE [DISTWIDTH] IN BLOOD BY AUTOMATED COUNT: 13.5 % (ref 11.5–14.5)
GFR SERPL CREATININE-BSD FRML MDRD: 44 ML/MIN/1.73 (ref 60–90)
GLOBULIN UR ELPH-MCNC: 2.9 GM/DL (ref 2.3–3.5)
GLUCOSE BLD-MCNC: 110 MG/DL (ref 60–100)
GLUCOSE UR STRIP-MCNC: NEGATIVE MG/DL
HBA1C MFR BLD: 6.5 % (ref 4–5.6)
HCT VFR BLD AUTO: 36.9 % (ref 35–45)
HDLC SERPL-MCNC: 69 MG/DL (ref 60–200)
HGB BLD-MCNC: 12.2 G/DL (ref 12–15.5)
HGB UR QL STRIP.AUTO: NEGATIVE
IMM GRANULOCYTES # BLD: 0.02 10*3/MM3 (ref 0–0.02)
IMM GRANULOCYTES NFR BLD: 0.3 % (ref 0–0.5)
KETONES UR QL STRIP: NEGATIVE
LDLC/HDLC SERPL: 1.12 {RATIO} (ref 0–3.22)
LEUKOCYTE ESTERASE UR QL STRIP.AUTO: NEGATIVE
LYMPHOCYTES # BLD AUTO: 2.25 10*3/MM3 (ref 0.6–4.2)
LYMPHOCYTES NFR BLD AUTO: 34.9 % (ref 10–50)
MAGNESIUM SERPL-MCNC: 1.4 MG/DL (ref 1.6–2.3)
MCH RBC QN AUTO: 30 PG (ref 26.5–34)
MCHC RBC AUTO-ENTMCNC: 33.1 G/DL (ref 31.4–36)
MCV RBC AUTO: 90.7 FL (ref 80–98)
MONOCYTES # BLD AUTO: 0.43 10*3/MM3 (ref 0–0.9)
MONOCYTES NFR BLD AUTO: 6.7 % (ref 0–12)
NEUTROPHILS # BLD AUTO: 3.65 10*3/MM3 (ref 2–8.6)
NEUTROPHILS NFR BLD AUTO: 56.5 % (ref 37–80)
NITRITE UR QL STRIP: NEGATIVE
PH UR STRIP.AUTO: 6.5 [PH] (ref 5–9)
PLATELET # BLD AUTO: 125 10*3/MM3 (ref 150–450)
PMV BLD AUTO: 11.3 FL (ref 8–12)
POTASSIUM BLD-SCNC: 4.4 MMOL/L (ref 3.5–5.1)
PROT SERPL-MCNC: 7 G/DL (ref 6.3–8.6)
PROT UR QL STRIP: ABNORMAL
RBC # BLD AUTO: 4.07 10*6/MM3 (ref 3.77–5.16)
SODIUM BLD-SCNC: 136 MMOL/L (ref 137–145)
SP GR UR STRIP: 1.02 (ref 1–1.03)
T4 FREE SERPL-MCNC: 1.65 NG/DL (ref 0.78–2.19)
TRIGL SERPL-MCNC: 152 MG/DL (ref 20–199)
TSH SERPL DL<=0.05 MIU/L-ACNC: 1.99 MIU/ML (ref 0.46–4.68)
UROBILINOGEN UR QL STRIP: ABNORMAL
VIT B12 BLD-MCNC: >1000 PG/ML (ref 239–931)
WBC NRBC COR # BLD: 6.45 10*3/MM3 (ref 3.2–9.8)

## 2018-03-07 PROCEDURE — 36415 COLL VENOUS BLD VENIPUNCTURE: CPT

## 2018-03-07 PROCEDURE — 82306 VITAMIN D 25 HYDROXY: CPT

## 2018-03-07 PROCEDURE — 84443 ASSAY THYROID STIM HORMONE: CPT

## 2018-03-07 PROCEDURE — 80061 LIPID PANEL: CPT

## 2018-03-07 PROCEDURE — 82607 VITAMIN B-12: CPT

## 2018-03-07 PROCEDURE — 85025 COMPLETE CBC W/AUTO DIFF WBC: CPT

## 2018-03-07 PROCEDURE — 83036 HEMOGLOBIN GLYCOSYLATED A1C: CPT

## 2018-03-07 PROCEDURE — 80053 COMPREHEN METABOLIC PANEL: CPT

## 2018-03-07 PROCEDURE — 83735 ASSAY OF MAGNESIUM: CPT

## 2018-03-07 PROCEDURE — 93288 INTERROG EVL PM/LDLS PM IP: CPT | Performed by: NURSE PRACTITIONER

## 2018-03-07 PROCEDURE — 84439 ASSAY OF FREE THYROXINE: CPT

## 2018-03-07 PROCEDURE — 81003 URINALYSIS AUTO W/O SCOPE: CPT

## 2018-03-07 NOTE — PROGRESS NOTES
Pacemaker Evaluation Report    March 7, 2018    Primary Cardiologist:  Implanting MD: Dr. Enriquez  :[employer]     Model: SESR01     Serial Number: [id number]  Implant date: 5/26/15     Reason for evaluation:routine  Cardiac device indication(s):sinus node dysfunction    Battery  JESSE: 7.5 yrs    Interrogation Results  Ventricular sensing: R wave: 11.2-15.6 mV  Ventricular capture: 0.5 V @ 0.4 ms  Ventricular lead impedance: right  548 ohms    Parameters  Mode: VVIR  Base Rate: 60/130    Diagnostic Data   Ventricular paced: 70.9 %  VHR: 1  12 sec on 9/25/17 rate 171bpm    Changes made: no changes    Conclusions: Normal devicer function    Assessment:  1. Sick sinus syndrome    2. Presence of permanent cardiac pacemaker              This document has been electronically signed by JASPER Miranda on March 7, 2018 11:58 AM

## 2018-03-08 ENCOUNTER — TELEPHONE (OUTPATIENT)
Dept: FAMILY MEDICINE CLINIC | Facility: CLINIC | Age: 83
End: 2018-03-08

## 2018-03-08 DIAGNOSIS — E83.42 HYPOMAGNESEMIA: Primary | ICD-10-CM

## 2018-03-08 RX ORDER — THIAMINE HCL 100 MG
1 TABLET ORAL DAILY
Qty: 30 TABLET | Refills: 0 | Status: SHIPPED | OUTPATIENT
Start: 2018-03-08 | End: 2018-04-21

## 2018-03-08 NOTE — TELEPHONE ENCOUNTER
Called and spoke to pts grandson who cares for her and sees her everyday.    Relayed her results and instructions to him I will cancel the mag at this pharmacy and call it in to the [facility] as instructed by Evgeny.

## 2018-03-08 NOTE — TELEPHONE ENCOUNTER
----- Message from JASPER Becerra sent at 3/8/2018  9:01 AM CST -----  This patient needs you to call her and her family member Evgeny to relay these results to both of them so Evgeny can make sure she keeps up with everything she is supposed to.  Her number is the [phone] and Evgeny's is [phone]. She gave me verbal permission to call him in the office and they have a release form with his info on it.     Lab work:  -CBC looks at WBC counts for infection and RBC/hgb/hct for anemias and other blood disorders, all of these labs were normal except for her platelet count which is abnormally low at 125,000, it looks like patient runs 120-125,000 on a regular basis but it does not look like she's ever had a workup so we will do that when I next order labs.  -CMP looks at your kidney function, liver function, electrolytes, and a preliminary test for diabetes, all of these labs were normal except her glucose was slightly elevated which would be expected with a diabetic patient, sodium was 136 though slightly low salt like patient to increase the salt in her diet mainly just by adding salt to her meals nothing extreme.  Patient's creatinine is very slightly elevated at 1.18, anything over 1 is elevated, we will just recheck this in a month, if she is even slightly dehydrated, this could be abnormally elevated.  Calcium level is high at 10.4, normal is 10.2 some argue normal is 10.  Asked patient if she is taking any over-the-counter vitamins or antiacids that are not on her prescription list I know she is on the ranitidine/Zantac, but if she is taking Tums or Pepcid or anything like that or a multivitamin, please let me know and advised that she stops these for this time.  We'll recheck in one month  -Urinalysis shows small amount of bilirubin and proteins which is also normal with the diabetes and possibly with dehydration.  -Lipid panel is fantastic all within normal limits and her HDL is 69, which anything  above 40 is great  -Magnesium level is slightly low so I will call her in a magnesium supplement to take, and we will recheck this in one month.  -Thyroid function tests are normal.  -Vitamin D is normal, will continue vitamin D as prescribed twice a month.  -Vitamin B12 is extremely high and over what it needs to be, patient does not need to get vitamin B12 injections for the next 6 months, we will recheck this in 6 months.  -A1c is 6.5, 5 months ago was 6.3, so it has worsened a little bit but not significantly enough to change her medication because at this point would have to add something onto the metformin and I don't think that we need to do that I be worried about hypoglycemia if we added anything at this point in time.  We will recheck that in 6 months.    Ask how patient is doing with her rash and see if we need to have an appt soon to really discuss these changes with labs. I will summarize changes below for you to reinforce, have pt or Evgeny write them down or come in for further instructions as an appt and I will write them down. If rash is improving and they understand all the lab instructions, I need a one mtn appt (book for an to establish care at like a 3:30 or 10:30 appt because pt wanted me to take over her care). She will get labs done at this appt but does not need to fast for them. If rash is not better or needs to discuss lab work further, f/u with me as soon as they have time.     Summary of changes:  1.  Stop vitamin B12 injections  2.  Stop multivitamin, calcium supplements if she is taking them, anti-acid except for the Zantac if she is taking them  3.  Increased sodium in diet by adding salt to meals.  4.  Take magnesium supplement as I will call in to her pharmacy    Please message me back and let me know what they would like to do and if she is on any multivitamins, antacids, or calcium supplements besides what is in her chart.

## 2018-03-11 PROBLEM — I10 ESSENTIAL HYPERTENSION: Status: ACTIVE | Noted: 2018-03-11

## 2018-03-11 PROBLEM — F32.A DEPRESSION: Status: ACTIVE | Noted: 2018-03-11

## 2018-03-11 PROBLEM — E53.8 VITAMIN B 12 DEFICIENCY: Status: ACTIVE | Noted: 2018-03-11

## 2018-03-11 PROBLEM — K21.9 GASTROESOPHAGEAL REFLUX DISEASE WITHOUT ESOPHAGITIS: Status: ACTIVE | Noted: 2018-03-11

## 2018-03-11 PROBLEM — G47.00 INSOMNIA: Status: ACTIVE | Noted: 2018-03-11

## 2018-03-11 PROBLEM — E11.9 TYPE 2 DIABETES MELLITUS WITHOUT COMPLICATION, WITHOUT LONG-TERM CURRENT USE OF INSULIN (HCC): Status: ACTIVE | Noted: 2018-03-11

## 2018-03-11 PROBLEM — E78.5 HYPERLIPIDEMIA: Status: ACTIVE | Noted: 2018-03-11

## 2018-03-11 PROBLEM — R63.0 DECREASED APPETITE: Status: ACTIVE | Noted: 2018-03-11

## 2018-03-11 PROBLEM — R53.83 OTHER FATIGUE: Status: ACTIVE | Noted: 2018-03-11

## 2018-03-11 PROBLEM — I48.20 CHRONIC ATRIAL FIBRILLATION (HCC): Status: ACTIVE | Noted: 2018-03-11

## 2018-03-11 PROBLEM — E55.9 VITAMIN D DEFICIENCY: Status: ACTIVE | Noted: 2018-03-11

## 2018-03-11 PROBLEM — M85.80 OSTEOPENIA: Status: ACTIVE | Noted: 2018-03-11

## 2018-03-12 NOTE — PROGRESS NOTES
Subjective   Any Dozier is a 84 y.o. female who presents to the office to establish care and f/u on rash.    History of Present Illness     Decreased appetite/fatigue  Onset/Frequency/Duration: xseveral mtns, progressively worsening  Description/History/Symptoms: eats very little and just does not want to eat, grandson states more unsteady on her feet than usual   Past Treatments Tried: NA  Current Treatments: NA   Effectiveness: NA    Insomnia   Onset/Frequency/Duration: chronic  Description/History/Symptoms: trouble falling asleep, pt will fall asleep and sleep for about an hour then take a long time to get go back to sleep  Past Treatments Tried: NA  Current Treatments: NA   Effectiveness: NA    T2DM  Onset/Frequency/Duration: xyrs  Description/History/Symptoms: Last A1c was 6.3 and microlbuminuria was WNL on 9/29/17.    Past Treatments Tried: NA  Current Treatments: Metformin 1000mg BID   Effectiveness: Last A1c 6.3    Hyperlipidemia  Onset/Frequency/Duration: xyrs  Description/History/Symptoms: last lipid panel 9/29/17 showed , Trig 72, HDL 67, .   Past Treatments Tried: NA  Current Treatments: pravastatin 40mg at bedtime   Effectiveness: last lipid panel almost WNL on 9/29/17    Depression  Onset/Frequency/Duration: xyrs  Description/History/Symptoms: mild symptoms, controlled well on medications   Past Treatments Tried: NA  Current Treatments: zoloft 100mg daily   Effectiveness: significant    GERD  Onset/Frequency/Duration: xyrx  Description/History/Symptoms: NA   Past Treatments Tried: NA  Current Treatments: zantac 150mg BID   Effectiveness: significant    HTN/chronic a fib  Onset/Frequency/Duration: xyrs  Description/History/Symptoms: denies any CP, SOB, Palpitations, peripheral edema, or HA. Hx of pacemaker.   Past Treatments Tried: NA  Current Treatments: Lisinopril 40mg daily, norvasc 5mg daily, coreg 25mg BID, aspirin 81mg daily   Effectiveness: significant    Rash: Patient  states this is re-current.  Was last seen for it on 10/16/17 by Dr. Cole Shi, diagnosed as shingles on the back, prescribed Medrol Dosepak and famciclovir.  In the note it does show that patient was prescribed a shingles vaccination, patient states that she does not remember this prescription and she has not gotten a vaccination.  Patient states that she did take all of the medication prescribed by Dr. Shi from her last visit and that it was two prescriptions.  Patient states it got better on her back to some extent but then spread to her face.  Patient states that she has had on her face for about 3-4 weeks, it is around her mouth, the right side of her upper back, and her upper chest.  Patient denies any possible bug bites or anyone else around her such as family who has had the same type of rash.  Patient states it itches and burns most of the time.  Patient states that they are like blisters that open up and drain yellowish type fluid.  Patient denies any fever, body aches, or chills.  Patient states no change in laundry detergent, body soaps, etc.  Patient states she has not been outside unless it's just to travel for one place to another. 2/27/18: prescribed famvir x 10days, pred taper over 10 days, hydroxyzine for itching. 3/6/18: pt states not improving and has new spots. Accompanied by grandson who states they are not bug bites. Going to try treating as a possible bacterial infection.     Personal History:  -Vitamin B12 Deficiency: on Vit B12 injections monthly.  -Vitamin D Deficiency: on Vit D 50,000units capsule weekly.  -Osteopenia:   4/12/16 last dexa scan: CONCLUSION-    1.  Bone mineral density measurements as above.   2.  Lumbar spine-  Osteopenia    3.  Femur-  Osteopenia        Last work:  10/24/17  -vit b12 393  -CBC showed plt 120, RDW 46.9  -ferritin WNL  9/29/17  -A1c 6.3  -tsh/t4 WNL  -lipid showed , Trig 72, HDL 67,   -CMP showed , BUN 29  -microalbuminuria  WNL  -CBC showed hgb 11.3, RDW 46.4, plt 121, neutrophils 1.95    The following portions of the patient's history were reviewed and updated as appropriate: allergies, current medications, past family history, past medical history, past social history, past surgical history and problem list.    MAYRA change in review, patient has clean MAYRA.    Review of Systems   Constitutional: Positive for activity change, appetite change and fatigue. Negative for chills, diaphoresis, fever and unexpected weight change.   HENT: Negative.  Negative for congestion, ear discharge, ear pain, nosebleeds, postnasal drip, rhinorrhea, sinus pain, sinus pressure, sneezing, sore throat, tinnitus and trouble swallowing.    Eyes: Negative.    Respiratory: Negative for cough, chest tightness, shortness of breath and wheezing.    Cardiovascular: Negative for chest pain, palpitations and leg swelling.   Gastrointestinal: Negative for abdominal distention, abdominal pain, blood in stool, constipation, diarrhea, nausea and vomiting.   Genitourinary: Negative for difficulty urinating, dyspareunia, dysuria, flank pain, frequency, hematuria, menstrual problem, vaginal bleeding, vaginal discharge and vaginal pain.   Musculoskeletal: Negative for arthralgias, back pain, gait problem, joint swelling and myalgias.   Skin: Positive for rash and wound. Negative for color change and pallor.   Allergic/Immunologic: Negative for environmental allergies, food allergies and immunocompromised state.   Neurological: Negative for dizziness, tremors, seizures, syncope, weakness, light-headedness, numbness and headaches.   Hematological: Does not bruise/bleed easily.   Psychiatric/Behavioral: Positive for sleep disturbance. Negative for behavioral problems, self-injury and suicidal ideas. The patient is not nervous/anxious.        Past Medical History:   Diagnosis Date   • Atrial fibrillation, chronic     CONTROLLED   • Chest pain 11/13/2014   • Diabetes mellitus  "    TYPE 2   • Dizziness 11/20/2014   • Dyspnea 04/29/2014   • History of echocardiogram 05/26/2015    W/COLOR FLOW No evidence of pericardial effusion. Depressed LV systolic function, Ef of 40-45%.Severe left atrial enlargement.Single chamber pacemaker lead visualized in the right cardiac chamber attached to the interventricular septum   • History of permanent cardiac pacemaker placement 07/02/2015   • Hypertension    • Presence of permanent cardiac pacemaker     ACUTE, MODERATE   • Sick sinus syndrome     CHRONIC, CONTROLLED   • Tachycardia-bradycardia     WITH PAUSES, CHRONIC, CONTROLLED       Family History   Problem Relation Age of Onset   • Stroke Other    • Hypertension Other    • Heart disease Other    • Cancer Other    • Hypertension Mother    • Stroke Mother    • Heart attack Father    • Cancer Sister    • Cancer Brother           Objective   /80   Pulse 81   Temp 98 °F (36.7 °C) (Temporal Artery )   Resp 16   Ht 157.5 cm (62\")   Wt 67.1 kg (148 lb)   SpO2 99%   Breastfeeding? No   BMI 27.07 kg/m²   Physical Exam   Constitutional: She is oriented to person, place, and time. She appears well-developed and well-nourished. She is cooperative. She does not appear ill. No distress.   HENT:   Head: Normocephalic.   Right Ear: Hearing, tympanic membrane, external ear and ear canal normal.   Left Ear: Hearing, tympanic membrane, external ear and ear canal normal.   Nose: Nose normal.   Mouth/Throat: Oropharynx is clear and moist. No oropharyngeal exudate.   Eyes: EOM and lids are normal. Pupils are equal, round, and reactive to light. Right eye exhibits no discharge. Left eye exhibits no discharge. Right conjunctiva is not injected. Left conjunctiva is not injected.   Neck: Normal range of motion. Neck supple.   Cardiovascular: Normal rate, regular rhythm, normal heart sounds and intact distal pulses.  Exam reveals no gallop and no friction rub.    No murmur heard.  Pulmonary/Chest: Effort normal and " breath sounds normal. No respiratory distress. She has no wheezes. She has no rales.   Abdominal: Soft. Normal appearance, normal aorta and bowel sounds are normal. She exhibits no distension and no mass. There is no tenderness. There is no rebound and no guarding. No hernia.   Musculoskeletal: Normal range of motion. She exhibits no edema, tenderness or deformity.   Lymphadenopathy:     She has no cervical adenopathy.   Neurological: She is alert and oriented to person, place, and time. She has normal strength and normal reflexes. She displays normal reflexes. No cranial nerve deficit or sensory deficit. She exhibits normal muscle tone. She displays a negative Romberg sign. Gait abnormal. Coordination normal.   Reflex Scores:       Patellar reflexes are 2+ on the right side and 2+ on the left side.  Uses assistive device to get around.    Skin: Skin is warm, dry and intact. No rash noted. She is not diaphoretic. No erythema. No pallor.        Psychiatric: She has a normal mood and affect. Her speech is normal and behavior is normal. Thought content normal. Cognition and memory are normal.   Patient is dressed appropriately for weather and situation, makes eye contact, and engages in conversation.  She is attentive.   Nursing note and vitals reviewed.       PHQ-2/PHQ-9 Depression Screening 3/6/2018   Little interest or pleasure in doing things 0   Feeling down, depressed, or hopeless 0   Total Score 0         Assessment/Plan   Any was seen today for rash.    Diagnoses and all orders for this visit:    Type 2 diabetes mellitus without complication, without long-term current use of insulin  -     CBC & Differential; Future  -     Comprehensive Metabolic Panel; Future  -     Hemoglobin A1c; Future  -     Lipid Panel; Future  -     Urinalysis With / Culture If Indicated - Urine, Clean Catch; Future  -     TSH; Future    Decreased appetite  -     megestrol (MEGACE) 20 MG tablet; Take 1 tablet by mouth Daily.  -     CBC  & Differential; Future  -     Comprehensive Metabolic Panel; Future    Essential hypertension  -     CBC & Differential; Future  -     Comprehensive Metabolic Panel; Future  -     Lipid Panel; Future  -     Magnesium; Future  -     Urinalysis With / Culture If Indicated - Urine, Clean Catch; Future    Rash  -     mupirocin (BACTROBAN) 2 % ointment; Apply  topically 3 (Three) Times a Day. Apply to lesions  -     doxycycline (MONODOX) 100 MG capsule; Take 1 capsule by mouth 2 (Two) Times a Day.    Hyperlipidemia, unspecified hyperlipidemia type  -     Lipid Panel; Future    Other fatigue  -     CBC & Differential; Future  -     Comprehensive Metabolic Panel; Future  -     TSH; Future  -     T4, Free; Future  -     Vitamin D 25 Hydroxy; Future  -     Vitamin B12; Future    Vitamin B 12 deficiency  -     Vitamin B12; Future    Osteopenia, unspecified location  -     Vitamin D 25 Hydroxy; Future    Vitamin D deficiency    Gastroesophageal reflux disease without esophagitis    Insomnia, unspecified type    Depression, unspecified depression type    Chronic atrial fibrillation      Decreased appetite/fatigue-new problem-worsening  -started on megace 20mg daily, fatigue workup labs, f/u one mtn    Insomnia-chronic problem, worsening  -lab work up first, then consider medications at next visit    T2DM-chronic problem, stable  -continue Metformin 1000mg BID   -a1c ordered  -next appt ask about eye appt, do foot exam    Hyperlipidemia-chronic problem, stable  - continue pravastatin 40mg at bedtime   -lipid panel ordered  -f/u one mtn    Depression-chronic problem, stable  -continue zoloft 100mg daily   -f/u in six mtns    GERD-chronic problem, stable  -continue zantac 150mg BID   -f/u in six mtns    HTN/chronic a fib-chronic problem, stable  -Continue lisinopril 40mg daily, norvasc 5mg daily, coreg 25mg BID, aspirin 81mg daily   -f/u six mtns    Rash-chronic problem-worsening   -treat like bacterial infection, doxycyline,  bactroban to lesions, f/u next appt, did not want referral to derm at this time    -Vitamin B12 Deficiency:  Continue on on Vit B12 injections monthly, recheck vit b12    -Vitamin D Deficiency: continue on Vit D 50,000units capsule weekly, recheck vit d    Osteopenia: next dexa scan due after 4/12/18, pt on vit d supplements.      Patient educated to follow-up sooner than next scheduled appointment if condition(s) worse or do not improve. Patient states understanding and is in agreeance with plan of care. An After Visit Summary was printed and given to the patient.    Ask why pt is on mobic?      JASPER Becerra        This document has been electronically signed by JASPER Becerra on March 11, 2018 10:38 PM      EMR/Transcription Dragon Disclaimer:  Some of this note may be an electronic dragon transcription/translation of spoken language to printed text. The electronic translation of spoken language may permit erroneous, or at times, nonsensical words or phrases to be inadvertently transcribed. Although I have reviewed the note for such errors, some may still exist.

## 2018-03-16 ENCOUNTER — TELEPHONE (OUTPATIENT)
Dept: FAMILY MEDICINE CLINIC | Facility: CLINIC | Age: 83
End: 2018-03-16

## 2018-03-16 NOTE — TELEPHONE ENCOUNTER
----- Message from JASPER Becerra sent at 3/12/2018 12:20 PM CDT -----  Ask if she wants us to refer her to dermatology?  ----- Message -----  From: JASPER Becerra  Sent: 3/11/2018  10:14 PM  To: JASPER Becerra    dermatologist

## 2018-03-22 ENCOUNTER — TELEPHONE (OUTPATIENT)
Dept: FAMILY MEDICINE CLINIC | Facility: CLINIC | Age: 83
End: 2018-03-22

## 2018-03-22 NOTE — TELEPHONE ENCOUNTER
Spoke with evelina he stated that the rash is getting better and that they have an appt to follow up in a few weeks

## 2018-05-14 ENCOUNTER — OFFICE VISIT (OUTPATIENT)
Dept: FAMILY MEDICINE CLINIC | Facility: CLINIC | Age: 83
End: 2018-05-14

## 2018-05-14 VITALS
HEART RATE: 85 BPM | OXYGEN SATURATION: 99 % | TEMPERATURE: 98 F | SYSTOLIC BLOOD PRESSURE: 127 MMHG | DIASTOLIC BLOOD PRESSURE: 70 MMHG | WEIGHT: 144.8 LBS | BODY MASS INDEX: 24.72 KG/M2 | HEIGHT: 64 IN

## 2018-05-14 DIAGNOSIS — F03.90 DEMENTIA WITHOUT BEHAVIORAL DISTURBANCE, UNSPECIFIED DEMENTIA TYPE: Primary | ICD-10-CM

## 2018-05-14 DIAGNOSIS — Z95.0 PRESENCE OF PERMANENT CARDIAC PACEMAKER: ICD-10-CM

## 2018-05-14 DIAGNOSIS — R63.0 DECREASED APPETITE: ICD-10-CM

## 2018-05-14 DIAGNOSIS — Z71.89 ENCOUNTER FOR HOME SAFETY REVIEW FOR INJURY PREVENTION: ICD-10-CM

## 2018-05-14 DIAGNOSIS — K21.9 GASTROESOPHAGEAL REFLUX DISEASE WITHOUT ESOPHAGITIS: ICD-10-CM

## 2018-05-14 DIAGNOSIS — M85.80 OSTEOPENIA, UNSPECIFIED LOCATION: ICD-10-CM

## 2018-05-14 DIAGNOSIS — I48.20 CHRONIC ATRIAL FIBRILLATION (HCC): ICD-10-CM

## 2018-05-14 PROCEDURE — 99213 OFFICE O/P EST LOW 20 MIN: CPT | Performed by: NURSE PRACTITIONER

## 2018-05-14 RX ORDER — DONEPEZIL HYDROCHLORIDE 5 MG/1
5 TABLET, FILM COATED ORAL NIGHTLY
Qty: 30 TABLET | Refills: 1 | Status: SHIPPED | OUTPATIENT
Start: 2018-05-14 | End: 2018-07-09 | Stop reason: SDUPTHER

## 2018-05-14 RX ORDER — MEGESTROL ACETATE 40 MG/ML
400 SUSPENSION ORAL DAILY
Qty: 480 ML | Refills: 1 | Status: SHIPPED | OUTPATIENT
Start: 2018-05-14 | End: 2018-07-09 | Stop reason: SDUPTHER

## 2018-05-14 NOTE — PROGRESS NOTES
Subjective   Any Dozier is a 85 y.o. female.  Presents today to establish care, family member present and wonders if there are any medications that she can stop taking.  Tells me that her niece lives close by and prefills her med tray, comes to her house three times a day.  Chronic A-Fib caused her to get pacemaker which was last tested two months ago.  Concern if pt was taking her meds as ordered so family member started looking after meds three weeks ago.  Not certain which meds she was or was not taking.  Started on Megestrol two months ago by MARCIN Samuel, has tolerated with no problems but has lost four pounds.  Admits she staggers and stumbles at homes.  Does wear a LifeAlert.  Has a Shitzhu at home.  Lives alone in her own home.    Uses the stove and oven.    few years ago.  Has been seeing Jose Guadalupe.    History of Present Illness     The following portions of the patient's history were reviewed and updated as appropriate: allergies, current medications, past family history, past medical history, past social history, past surgical history and problem list.    Review of Systems   Constitutional: Negative for chills, fatigue and fever.   HENT: Negative for congestion, sneezing, sore throat and trouble swallowing.    Eyes: Negative for visual disturbance.   Respiratory: Negative for cough, chest tightness, shortness of breath and wheezing.    Cardiovascular: Negative for chest pain, palpitations and leg swelling.   Gastrointestinal: Negative for abdominal pain, constipation, diarrhea, nausea and vomiting.   Genitourinary: Negative for dysuria, frequency and urgency.   Musculoskeletal: Negative for neck pain.   Skin: Negative for rash.   Neurological: Negative for dizziness, weakness and headaches.   Psychiatric/Behavioral: Positive for confusion (intermittent).        In the past two weeks the pt has not felt down, depressed, hopeless or lost interest in doing things   All other systems reviewed  and are negative.      Objective   Physical Exam   Constitutional: She is oriented to person, place, and time. She appears well-developed and well-nourished. She is cooperative.  Non-toxic appearance. She does not have a sickly appearance.   Alert and oriented x2   HENT:   Head: Normocephalic and atraumatic.   Right Ear: External ear normal.   Left Ear: External ear normal.   Nose: Nose normal.   Mouth/Throat: Oropharynx is clear and moist.   Eyes: Conjunctivae and EOM are normal. Pupils are equal, round, and reactive to light. Right eye exhibits no discharge. Left eye exhibits no discharge. No scleral icterus.   Neck: Normal range of motion. Neck supple. No thyromegaly present.   Cardiovascular: Normal rate, normal heart sounds and intact distal pulses.  An irregular rhythm present. Exam reveals no gallop and no friction rub.    No murmur heard.  Pulmonary/Chest: Effort normal and breath sounds normal. No respiratory distress. She has no wheezes. She has no rales.   Abdominal: Soft. Bowel sounds are normal. She exhibits no distension. There is no tenderness. There is no rebound and no guarding.   Musculoskeletal: Normal range of motion. She exhibits no edema.   Lymphadenopathy:     She has no cervical adenopathy.   Neurological: She is alert and oriented to person, place, and time. No cranial nerve deficit.   Skin: Skin is warm and dry. No rash noted.   Psychiatric: She has a normal mood and affect. Her behavior is normal. Judgment and thought content normal.   Nursing note and vitals reviewed.      Assessment/Plan   Any was seen today for establish care.    Diagnoses and all orders for this visit:    Dementia without behavioral disturbance, unspecified dementia type  -     Ambulatory Referral to Home Health    Decreased appetite    Encounter for home safety review for injury prevention    Gastroesophageal reflux disease without esophagitis    Osteopenia, unspecified location    Chronic atrial  fibrillation    Presence of permanent cardiac pacemaker    Other orders  -     megestrol acetate (MEGACE) 400 MG/10ML suspension oral suspension; Take 10 mL by mouth Daily.  -     donepezil (ARICEPT) 5 MG tablet; Take 1 tablet by mouth Every Night.    Will try a slow wean of Zoloft.  Refer to Home Health for therapy services.

## 2018-05-15 PROBLEM — F03.90 DEMENTIA WITHOUT BEHAVIORAL DISTURBANCE (HCC): Status: ACTIVE | Noted: 2018-05-15

## 2018-05-15 PROBLEM — Z71.89 ENCOUNTER FOR HOME SAFETY REVIEW FOR INJURY PREVENTION: Status: ACTIVE | Noted: 2018-05-15

## 2018-05-15 PROBLEM — R63.4 WEIGHT LOSS, UNINTENTIONAL: Status: ACTIVE | Noted: 2018-05-15

## 2018-05-18 DIAGNOSIS — Z76.0 MEDICATION REFILL: ICD-10-CM

## 2018-05-18 RX ORDER — MAGNESIUM OXIDE 400 MG/1
400 TABLET ORAL DAILY
Qty: 30 TABLET | Refills: 5 | Status: SHIPPED | OUTPATIENT
Start: 2018-05-18 | End: 2020-01-22

## 2018-05-18 RX ORDER — AMLODIPINE BESYLATE 5 MG/1
5 TABLET ORAL DAILY
Qty: 30 TABLET | Refills: 5 | Status: SHIPPED | OUTPATIENT
Start: 2018-05-18 | End: 2018-08-09

## 2018-05-18 RX ORDER — ERGOCALCIFEROL 1.25 MG/1
50000 CAPSULE ORAL
Qty: 8 CAPSULE | Refills: 5 | Status: SHIPPED | OUTPATIENT
Start: 2018-05-18 | End: 2020-01-22

## 2018-05-18 RX ORDER — LISINOPRIL 40 MG/1
40 TABLET ORAL DAILY
Qty: 30 TABLET | Refills: 5 | Status: SHIPPED | OUTPATIENT
Start: 2018-05-18

## 2018-05-18 RX ORDER — MELOXICAM 7.5 MG/1
15 TABLET ORAL DAILY
Qty: 60 TABLET | Refills: 5 | Status: SHIPPED | OUTPATIENT
Start: 2018-05-18 | End: 2020-01-22

## 2018-05-18 RX ORDER — SERTRALINE HYDROCHLORIDE 100 MG/1
100 TABLET, FILM COATED ORAL DAILY
Qty: 30 TABLET | Refills: 5 | Status: SHIPPED | OUTPATIENT
Start: 2018-05-18 | End: 2018-08-09

## 2018-05-18 RX ORDER — RANITIDINE 150 MG/1
150 CAPSULE ORAL 2 TIMES DAILY
Qty: 60 CAPSULE | Refills: 5 | Status: SHIPPED | OUTPATIENT
Start: 2018-05-18 | End: 2020-01-22

## 2018-05-18 RX ORDER — PRAVASTATIN SODIUM 40 MG
40 TABLET ORAL DAILY
Qty: 30 TABLET | Refills: 5 | Status: SHIPPED | OUTPATIENT
Start: 2018-05-18

## 2018-05-18 RX ORDER — CARVEDILOL 25 MG/1
25 TABLET ORAL 2 TIMES DAILY WITH MEALS
Qty: 60 TABLET | Refills: 5 | Status: SHIPPED | OUTPATIENT
Start: 2018-05-18

## 2018-05-22 ENCOUNTER — CLINICAL SUPPORT (OUTPATIENT)
Dept: FAMILY MEDICINE CLINIC | Facility: CLINIC | Age: 83
End: 2018-05-22

## 2018-05-22 DIAGNOSIS — E53.8 VITAMIN B 12 DEFICIENCY: Primary | ICD-10-CM

## 2018-05-22 PROCEDURE — 96372 THER/PROPH/DIAG INJ SC/IM: CPT | Performed by: NURSE PRACTITIONER

## 2018-05-22 RX ADMIN — CYANOCOBALAMIN 1000 MCG: 1000 INJECTION, SOLUTION INTRAMUSCULAR; SUBCUTANEOUS at 10:45

## 2018-07-09 ENCOUNTER — CLINICAL SUPPORT (OUTPATIENT)
Dept: FAMILY MEDICINE CLINIC | Facility: CLINIC | Age: 83
End: 2018-07-09

## 2018-07-09 ENCOUNTER — OFFICE VISIT (OUTPATIENT)
Dept: FAMILY MEDICINE CLINIC | Facility: CLINIC | Age: 83
End: 2018-07-09

## 2018-07-09 VITALS
DIASTOLIC BLOOD PRESSURE: 72 MMHG | SYSTOLIC BLOOD PRESSURE: 136 MMHG | WEIGHT: 157.4 LBS | BODY MASS INDEX: 26.87 KG/M2 | OXYGEN SATURATION: 99 % | HEART RATE: 84 BPM | HEIGHT: 64 IN | TEMPERATURE: 98 F

## 2018-07-09 DIAGNOSIS — I48.20 CHRONIC ATRIAL FIBRILLATION (HCC): ICD-10-CM

## 2018-07-09 DIAGNOSIS — F03.90 DEMENTIA WITHOUT BEHAVIORAL DISTURBANCE, UNSPECIFIED DEMENTIA TYPE: Primary | ICD-10-CM

## 2018-07-09 DIAGNOSIS — Z95.0 PRESENCE OF PERMANENT CARDIAC PACEMAKER: ICD-10-CM

## 2018-07-09 DIAGNOSIS — I49.5 SICK SINUS SYNDROME (HCC): ICD-10-CM

## 2018-07-09 DIAGNOSIS — E53.8 VITAMIN B 12 DEFICIENCY: Primary | ICD-10-CM

## 2018-07-09 PROCEDURE — 96372 THER/PROPH/DIAG INJ SC/IM: CPT | Performed by: NURSE PRACTITIONER

## 2018-07-09 PROCEDURE — 99213 OFFICE O/P EST LOW 20 MIN: CPT | Performed by: NURSE PRACTITIONER

## 2018-07-09 RX ORDER — DONEPEZIL HYDROCHLORIDE 5 MG/1
5 TABLET, FILM COATED ORAL NIGHTLY
Qty: 30 TABLET | Refills: 5 | Status: SHIPPED | OUTPATIENT
Start: 2018-07-09 | End: 2020-01-22

## 2018-07-09 RX ORDER — MEGESTROL ACETATE 40 MG/ML
400 SUSPENSION ORAL DAILY
Qty: 480 ML | Refills: 3 | Status: SHIPPED | OUTPATIENT
Start: 2018-07-09 | End: 2020-01-22

## 2018-07-09 RX ORDER — HYDROCHLOROTHIAZIDE 12.5 MG/1
12.5 TABLET ORAL DAILY
Qty: 30 TABLET | Refills: 5 | Status: SHIPPED | OUTPATIENT
Start: 2018-07-09 | End: 2020-01-22

## 2018-07-09 RX ADMIN — CYANOCOBALAMIN 1000 MCG: 1000 INJECTION, SOLUTION INTRAMUSCULAR; SUBCUTANEOUS at 12:02

## 2018-07-09 NOTE — PROGRESS NOTES
Subjective   Any Dozier is a 85 y.o. female who presents to the office for dementia follow-up.  Is brought in by a family member today who wishes to go over her labwork.  Is taking Vit B12 injections and will receive one today.  Does sit on the porch and will go to the mailbox which is being moved because it's on the other side of the road.  Stopped driving on 4/21.    History of Present Illness     The following portions of the patient's history were reviewed and updated as appropriate: allergies, current medications, past family history, past medical history, past social history, past surgical history and problem list.    Review of Systems   Constitutional: Negative for chills, fatigue and fever.   HENT: Negative for congestion, sneezing, sore throat and trouble swallowing.    Eyes: Negative for visual disturbance.   Respiratory: Negative for cough, chest tightness, shortness of breath and wheezing.    Cardiovascular: Negative for chest pain, palpitations and leg swelling.   Gastrointestinal: Negative for abdominal pain, constipation, diarrhea, nausea and vomiting.   Genitourinary: Negative for dysuria, frequency and urgency.   Musculoskeletal: Negative for neck pain.   Skin: Negative for rash.   Neurological: Negative for dizziness, weakness and headaches.   Psychiatric/Behavioral: Positive for dysphoric mood.        In the past two weeks the pt has not felt down, depressed, hopeless or lost interest in doing things   All other systems reviewed and are negative.      Objective   Physical Exam   Constitutional: She is oriented to person, place, and time. She appears well-developed and well-nourished. She is cooperative.  Non-toxic appearance. She does not have a sickly appearance. She appears ill. Distressed: chronically.   HENT:   Head: Normocephalic and atraumatic.   Right Ear: External ear normal.   Left Ear: External ear normal.   Nose: Nose normal.   Mouth/Throat: Uvula is midline, oropharynx is  clear and moist and mucous membranes are normal.   Eyes: Pupils are equal, round, and reactive to light. Conjunctivae, EOM and lids are normal. Right eye exhibits no discharge. Left eye exhibits no discharge. No scleral icterus.   Neck: Trachea normal, normal range of motion and phonation normal. Neck supple. No thyromegaly present.   Cardiovascular: Normal rate, regular rhythm, normal heart sounds and intact distal pulses.  Exam reveals no gallop and no friction rub.    No murmur heard.  Pulmonary/Chest: Effort normal and breath sounds normal. No respiratory distress. She has no wheezes. She has no rales.   Abdominal: Soft. Normal appearance and bowel sounds are normal. She exhibits no distension. There is no tenderness. There is no rebound and no guarding.   Musculoskeletal: Normal range of motion. She exhibits no edema.   Lymphadenopathy:     She has no cervical adenopathy.   Neurological: She is alert and oriented to person, place, and time. No cranial nerve deficit.   Skin: Skin is warm and dry. No rash noted.   Psychiatric: She has a normal mood and affect. Her behavior is normal. Judgment and thought content normal.   Nursing note and vitals reviewed.      Assessment/Plan      Any was seen today for follow-up.    Diagnoses and all orders for this visit:    Dementia without behavioral disturbance, unspecified dementia type    Presence of permanent cardiac pacemaker  -     Ambulatory Referral to Cardiology    Chronic atrial fibrillation (CMS/HCC)  -     Ambulatory Referral to Cardiology    Sick sinus syndrome (CMS/HCC)  -     Ambulatory Referral to Cardiology    Other orders  -     megestrol acetate (MEGACE) 400 MG/10ML suspension oral suspension; Take 10 mL by mouth Daily.  -     donepezil (ARICEPT) 5 MG tablet; Take 1 tablet by mouth Every Night.  -     hydrochlorothiazide (HYDRODIURIL) 12.5 MG tablet; Take 1 tablet by mouth Daily.    Will wean off Amlodipine, continue with HCTZ.    Daughter will continue  with safety features at patients' house.    Reviewed [facility]# [id number]

## 2018-07-16 ENCOUNTER — DOCUMENTATION (OUTPATIENT)
Dept: FAMILY MEDICINE CLINIC | Facility: CLINIC | Age: 83
End: 2018-07-16

## 2018-07-19 ENCOUNTER — TELEPHONE (OUTPATIENT)
Dept: FAMILY MEDICINE CLINIC | Facility: CLINIC | Age: 83
End: 2018-07-19

## 2018-07-19 RX ORDER — ALENDRONATE SODIUM 70 MG/1
70 TABLET ORAL
Qty: 12 TABLET | Refills: 1 | Status: SHIPPED | OUTPATIENT
Start: 2018-07-19 | End: 2018-07-19 | Stop reason: SDUPTHER

## 2018-07-19 RX ORDER — ALENDRONATE SODIUM 70 MG/1
70 TABLET ORAL
Qty: 12 TABLET | Refills: 1 | Status: SHIPPED | OUTPATIENT
Start: 2018-07-19 | End: 2020-01-22

## 2018-07-19 NOTE — TELEPHONE ENCOUNTER
Attempted to call pt and inform that mammogram was normal and to follow up in 1 year.  Phone answered but unable to understand D/T wind.  Pt to call back

## 2018-07-19 NOTE — TELEPHONE ENCOUNTER
Called pt daughter about bone scan and the need to take medication once weekly and has been sent to pharmacy.  Pt daughter voiced understanding and thanked for call

## 2018-08-09 ENCOUNTER — OFFICE VISIT (OUTPATIENT)
Dept: FAMILY MEDICINE CLINIC | Facility: CLINIC | Age: 83
End: 2018-08-09

## 2018-08-09 ENCOUNTER — CLINICAL SUPPORT (OUTPATIENT)
Dept: FAMILY MEDICINE CLINIC | Facility: CLINIC | Age: 83
End: 2018-08-09

## 2018-08-09 VITALS
WEIGHT: 163.2 LBS | TEMPERATURE: 98.1 F | BODY MASS INDEX: 27.86 KG/M2 | SYSTOLIC BLOOD PRESSURE: 132 MMHG | HEIGHT: 64 IN | HEART RATE: 75 BPM | DIASTOLIC BLOOD PRESSURE: 70 MMHG | OXYGEN SATURATION: 99 %

## 2018-08-09 DIAGNOSIS — E78.5 HYPERLIPIDEMIA, UNSPECIFIED HYPERLIPIDEMIA TYPE: ICD-10-CM

## 2018-08-09 DIAGNOSIS — K21.9 GASTROESOPHAGEAL REFLUX DISEASE WITHOUT ESOPHAGITIS: ICD-10-CM

## 2018-08-09 DIAGNOSIS — E53.8 VITAMIN B 12 DEFICIENCY: Primary | ICD-10-CM

## 2018-08-09 DIAGNOSIS — E11.9 TYPE 2 DIABETES MELLITUS WITHOUT COMPLICATION, WITHOUT LONG-TERM CURRENT USE OF INSULIN (HCC): ICD-10-CM

## 2018-08-09 DIAGNOSIS — E53.8 VITAMIN B 12 DEFICIENCY: ICD-10-CM

## 2018-08-09 DIAGNOSIS — E55.9 VITAMIN D DEFICIENCY: ICD-10-CM

## 2018-08-09 DIAGNOSIS — F10.27 DEMENTIA ASSOCIATED WITH ALCOHOLISM WITHOUT BEHAVIORAL DISTURBANCE (HCC): ICD-10-CM

## 2018-08-09 DIAGNOSIS — I48.20 CHRONIC ATRIAL FIBRILLATION (HCC): Primary | ICD-10-CM

## 2018-08-09 PROCEDURE — 96372 THER/PROPH/DIAG INJ SC/IM: CPT | Performed by: NURSE PRACTITIONER

## 2018-08-09 PROCEDURE — 99214 OFFICE O/P EST MOD 30 MIN: CPT | Performed by: NURSE PRACTITIONER

## 2018-08-09 RX ADMIN — CYANOCOBALAMIN 1000 MCG: 1000 INJECTION, SOLUTION INTRAMUSCULAR; SUBCUTANEOUS at 11:01

## 2018-08-24 NOTE — PROGRESS NOTES
Subjective   Any Dozier is a 85 y.o. female who presents to the office for Vit D deficnecy, HTN and GERD follow-up.  Is doing well with her medications.  Has gained six pounds.  History of Present Illness     The following portions of the patient's history were reviewed and updated as appropriate: allergies, current medications, past family history, past medical history, past social history, past surgical history and problem list.    Review of Systems   Constitutional: Negative for chills, fatigue and fever.   HENT: Negative for congestion, sneezing, sore throat and trouble swallowing.    Eyes: Negative for visual disturbance.   Respiratory: Negative for cough, chest tightness, shortness of breath and wheezing.    Cardiovascular: Negative for chest pain, palpitations and leg swelling.   Gastrointestinal: Negative for abdominal pain, constipation, diarrhea, nausea and vomiting.   Genitourinary: Negative for dysuria, frequency and urgency.   Musculoskeletal: Positive for arthralgias. Negative for neck pain.   Skin: Negative for rash.   Neurological: Negative for dizziness, weakness and headaches.   Psychiatric/Behavioral:        In the past two weeks the pt has not felt down, depressed, hopeless or lost interest in doing things   All other systems reviewed and are negative.      Objective   Physical Exam   Constitutional: She is oriented to person, place, and time. She appears well-developed and well-nourished. She is cooperative.  Non-toxic appearance. She does not have a sickly appearance. She appears ill (chronically).   HENT:   Head: Normocephalic and atraumatic.   Right Ear: External ear normal.   Left Ear: External ear normal.   Nose: Nose normal.   Mouth/Throat: Uvula is midline, oropharynx is clear and moist and mucous membranes are normal.   Eyes: Pupils are equal, round, and reactive to light. Conjunctivae and EOM are normal. Right eye exhibits no discharge. Left eye exhibits no discharge. No  scleral icterus.   Neck: Trachea normal, normal range of motion and phonation normal. Neck supple. Carotid bruit is not present. No thyromegaly present.   Cardiovascular: Normal rate, regular rhythm, normal heart sounds and intact distal pulses.  Exam reveals no gallop and no friction rub.    No murmur heard.  Pulmonary/Chest: Effort normal and breath sounds normal. No respiratory distress. She has no wheezes. She has no rales.   Abdominal: Soft. Normal appearance and bowel sounds are normal. She exhibits no distension. There is no tenderness. There is no rebound and no guarding.   Musculoskeletal: Normal range of motion. She exhibits no edema.     Vascular Status -  Her right foot exhibits no edema. Her left foot exhibits no edema.  Lymphadenopathy:     She has no cervical adenopathy.   Neurological: She is alert and oriented to person, place, and time. No cranial nerve deficit. GCS eye subscore is 4. GCS verbal subscore is 5. GCS motor subscore is 6.   Skin: Skin is warm, dry and intact. No rash noted.   Psychiatric: She has a normal mood and affect. Her speech is normal and behavior is normal. Judgment and thought content normal.   Dressed appropriately for weather and situation, pleasant and mild-mannered   Nursing note and vitals reviewed.      Assessment/Plan   Any was seen today for follow-up.    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  Will see back in six months.

## 2018-09-04 ENCOUNTER — TRANSCRIBE ORDERS (OUTPATIENT)
Dept: GENERAL RADIOLOGY | Facility: CLINIC | Age: 83
End: 2018-09-04

## 2018-09-04 DIAGNOSIS — I48.20 CHRONIC ATRIAL FIBRILLATION (HCC): ICD-10-CM

## 2018-09-04 DIAGNOSIS — I10 ESSENTIAL HYPERTENSION, MALIGNANT: ICD-10-CM

## 2018-09-04 DIAGNOSIS — E78.00 PURE HYPERCHOLESTEROLEMIA: ICD-10-CM

## 2018-09-04 DIAGNOSIS — I20.9 ANGINA PECTORIS (HCC): ICD-10-CM

## 2018-09-04 DIAGNOSIS — Z95.0 PRESENCE OF CARDIAC PACEMAKER: ICD-10-CM

## 2018-09-04 DIAGNOSIS — Z76.89 ESTABLISHING CARE WITH NEW DOCTOR, ENCOUNTER FOR: Primary | ICD-10-CM

## 2018-09-18 ENCOUNTER — LAB (OUTPATIENT)
Dept: LAB | Facility: OTHER | Age: 83
End: 2018-09-18

## 2018-09-18 DIAGNOSIS — I10 ESSENTIAL HYPERTENSION, MALIGNANT: ICD-10-CM

## 2018-09-18 DIAGNOSIS — I20.9 ANGINA PECTORIS (HCC): ICD-10-CM

## 2018-09-18 DIAGNOSIS — I48.20 CHRONIC ATRIAL FIBRILLATION (HCC): ICD-10-CM

## 2018-09-18 DIAGNOSIS — Z95.0 PRESENCE OF CARDIAC PACEMAKER: ICD-10-CM

## 2018-09-18 DIAGNOSIS — E78.00 PURE HYPERCHOLESTEROLEMIA: ICD-10-CM

## 2018-09-18 DIAGNOSIS — Z76.89 ESTABLISHING CARE WITH NEW DOCTOR, ENCOUNTER FOR: ICD-10-CM

## 2018-09-18 LAB
IRON 24H UR-MRATE: 83 MCG/DL (ref 37–170)
IRON SATN MFR SERPL: 22 % (ref 15–50)
TIBC SERPL-MCNC: 386 MCG/DL (ref 265–497)

## 2018-09-18 PROCEDURE — 83550 IRON BINDING TEST: CPT | Performed by: INTERNAL MEDICINE

## 2018-09-18 PROCEDURE — 83540 ASSAY OF IRON: CPT | Performed by: INTERNAL MEDICINE

## 2018-09-18 PROCEDURE — 36415 COLL VENOUS BLD VENIPUNCTURE: CPT | Performed by: INTERNAL MEDICINE

## 2018-09-24 ENCOUNTER — TELEPHONE (OUTPATIENT)
Dept: FAMILY MEDICINE CLINIC | Facility: CLINIC | Age: 83
End: 2018-09-24

## 2018-09-24 NOTE — TELEPHONE ENCOUNTER
Spoke with [pt daughter and informed than pt iron level was normal.  Daughter voiced understanding and thanked for call

## 2020-01-22 ENCOUNTER — OFFICE VISIT (OUTPATIENT)
Dept: FAMILY MEDICINE CLINIC | Facility: CLINIC | Age: 85
End: 2020-01-22

## 2020-01-22 VITALS
WEIGHT: 180.4 LBS | SYSTOLIC BLOOD PRESSURE: 132 MMHG | DIASTOLIC BLOOD PRESSURE: 78 MMHG | HEIGHT: 64 IN | BODY MASS INDEX: 30.8 KG/M2

## 2020-01-22 DIAGNOSIS — E11.9 TYPE 2 DIABETES MELLITUS WITHOUT COMPLICATION, WITHOUT LONG-TERM CURRENT USE OF INSULIN (HCC): ICD-10-CM

## 2020-01-22 DIAGNOSIS — E78.5 HYPERLIPIDEMIA, UNSPECIFIED HYPERLIPIDEMIA TYPE: ICD-10-CM

## 2020-01-22 DIAGNOSIS — I50.9 CONGESTIVE HEART FAILURE, UNSPECIFIED HF CHRONICITY, UNSPECIFIED HEART FAILURE TYPE (HCC): Primary | ICD-10-CM

## 2020-01-22 DIAGNOSIS — E66.9 OBESITY (BMI 30.0-34.9): ICD-10-CM

## 2020-01-22 DIAGNOSIS — Z95.0 PRESENCE OF PERMANENT CARDIAC PACEMAKER: ICD-10-CM

## 2020-01-22 DIAGNOSIS — I10 ESSENTIAL HYPERTENSION: ICD-10-CM

## 2020-01-22 PROCEDURE — 99214 OFFICE O/P EST MOD 30 MIN: CPT | Performed by: FAMILY MEDICINE

## 2020-01-22 RX ORDER — CYANOCOBALAMIN (VITAMIN B-12) 250 MCG
250 TABLET ORAL DAILY
COMMUNITY

## 2020-01-22 RX ORDER — FUROSEMIDE 40 MG/1
40 TABLET ORAL DAILY
Qty: 30 TABLET | Refills: 5 | Status: SHIPPED | OUTPATIENT
Start: 2020-01-22

## 2020-01-22 RX ORDER — PANTOPRAZOLE SODIUM 40 MG/1
40 TABLET, DELAYED RELEASE ORAL
COMMUNITY

## 2020-01-22 RX ORDER — AMLODIPINE BESYLATE 5 MG/1
TABLET ORAL
COMMUNITY
Start: 2019-11-05

## 2020-01-22 RX ORDER — INSULIN GLARGINE 100 [IU]/ML
INJECTION, SOLUTION SUBCUTANEOUS
COMMUNITY
Start: 2019-12-11

## 2020-01-22 RX ORDER — ISOSORBIDE DINITRATE 30 MG/1
30 TABLET ORAL
COMMUNITY

## 2020-01-22 NOTE — PROGRESS NOTES
Subjective   Any Dozier is a 85 y.o. female who presents today to [facility] care.  She is a new resident at [facility].  Her biggest concern today is edema in the legs.  She is not aware of a history of congestive heart failure but on review of her chart I find a BNP level done just a couple weeks ago that was over thousand.  She was given some Lasix in ER for only about 5 days.  She continues to have some shortness of breath and swelling of both legs, the right usually worse than the left.  She does have known atrial fibrillation cardiomyopathy and diabetes.    History of Present Illness     The following portions of the patient's history were reviewed and updated as appropriate: allergies, current medications, past family history, past medical history, past social history, past surgical history and problem list.    Review of Systems   Constitutional: Negative for activity change, appetite change, chills, diaphoresis, fatigue, fever and unexpected weight change.   HENT: Negative for congestion, dental problem, drooling, ear discharge, facial swelling, hearing loss, mouth sores, nosebleeds, rhinorrhea, sore throat, trouble swallowing and voice change.    Eyes: Positive for visual disturbance. Negative for photophobia, pain, discharge and redness.   Respiratory: Negative for apnea, cough, choking, chest tightness, shortness of breath and wheezing.    Cardiovascular: Negative for chest pain, palpitations and leg swelling.   Gastrointestinal: Negative for abdominal distention, anal bleeding, blood in stool, constipation, diarrhea, nausea and vomiting.   Endocrine: Negative for polyphagia and polyuria.   Genitourinary: Negative for decreased urine volume, difficulty urinating, dysuria, enuresis and frequency.   Musculoskeletal: Positive for arthralgias, back pain, gait problem, joint swelling and myalgias.   Skin: Negative for color change, pallor, rash and wound.   Allergic/Immunologic:  "Negative for immunocompromised state.   Neurological: Positive for weakness. Negative for dizziness, tremors, seizures, syncope, facial asymmetry, speech difficulty and headaches.   Hematological: Negative for adenopathy. Does not bruise/bleed easily.   Psychiatric/Behavioral: Negative for agitation, behavioral problems, confusion, dysphoric mood, hallucinations and sleep disturbance. The patient is not nervous/anxious and is not hyperactive.    All other systems reviewed and are negative.      Objective    Vitals:    01/22/20 1123   BP: 132/78   Weight: 81.8 kg (180 lb 6.4 oz)   Height: 162.6 cm (64\")   PainSc:   7   PainLoc: Head  Comment: RT knee     Body mass index is 30.97 kg/m².    Physical Exam   Constitutional: She is oriented to person, place, and time. She appears well-nourished. No distress.   HENT:   Head: Normocephalic and atraumatic.   Nose: Nose normal.   Mouth/Throat: Oropharynx is clear and moist.   Eyes: Pupils are equal, round, and reactive to light. Right eye exhibits no discharge. Left eye exhibits no discharge. No scleral icterus.   Neck: Normal range of motion. Neck supple. No tracheal deviation present. No thyromegaly present.   Cardiovascular: Normal rate, regular rhythm and intact distal pulses. Exam reveals no friction rub.   No murmur heard.  Abdominal: Soft. Bowel sounds are normal. She exhibits no distension and no mass. There is no tenderness. There is no rebound and no guarding.   Musculoskeletal: She exhibits edema. She exhibits no tenderness or deformity.   2+ edema right lower extremity, 1+ left   Neurological: She is alert and oriented to person, place, and time. No cranial nerve deficit.   Skin: Skin is warm and dry. She is not diaphoretic.   Psychiatric: She has a normal mood and affect. Her behavior is normal.   Nursing note and vitals reviewed.      Assessment/Plan   Any was seen today for establish care.    Diagnoses and all orders for this visit:    Congestive heart " failure, unspecified HF chronicity, unspecified heart failure type (CMS/HCC)    Presence of permanent cardiac pacemaker    Essential hypertension    Hyperlipidemia, unspecified hyperlipidemia type    Type 2 diabetes mellitus without complication, without long-term current use of insulin (CMS/[facility])    Obesity (BMI 30.0-34.9)    Other orders  -     furosemide (LASIX) 40 MG tablet; Take 1 tablet by mouth Daily.    Will add Lasix daily and recheck a BMP and a BNP in 2 weeks.  Keep the feet elevated anytime she is setting down.  Wrote orders to send back to the facility with her as well.  She will return to see me in 4 weeks    Continue current regimen for diabetes and testing her blood sugars daily and when needed    Continue with heart medications including Eliquis and carvedilol for atrial fibrillation.    Continue medicines as listed for hypertension, lipids.  Follow-up with her cardiologist as scheduled    Discussed the patient's BMI with her. BMI is above normal parameters. Follow-up plan includes:  educational material and nutrition counseling.            This document has been electronically signed by Payal Zheng MD on January 22, 2020 4:27 PM

## 2020-02-13 RX ORDER — PEN NEEDLE, DIABETIC 30 GX3/16"
1 NEEDLE, DISPOSABLE MISCELLANEOUS DAILY
Qty: 30 EACH | Refills: 5 | Status: SHIPPED | OUTPATIENT
Start: 2020-02-13

## 2020-03-02 ENCOUNTER — OFFICE VISIT (OUTPATIENT)
Dept: FAMILY MEDICINE CLINIC | Facility: CLINIC | Age: 85
End: 2020-03-02

## 2020-03-02 VITALS
SYSTOLIC BLOOD PRESSURE: 142 MMHG | HEIGHT: 64 IN | DIASTOLIC BLOOD PRESSURE: 86 MMHG | BODY MASS INDEX: 29.88 KG/M2 | WEIGHT: 175 LBS

## 2020-03-02 DIAGNOSIS — M25.551 RIGHT HIP PAIN: ICD-10-CM

## 2020-03-02 DIAGNOSIS — E11.9 TYPE 2 DIABETES MELLITUS WITHOUT COMPLICATION, WITHOUT LONG-TERM CURRENT USE OF INSULIN (HCC): Primary | ICD-10-CM

## 2020-03-02 DIAGNOSIS — E11.42 DIABETIC PERIPHERAL NEUROPATHY (HCC): ICD-10-CM

## 2020-03-02 DIAGNOSIS — I50.9 CONGESTIVE HEART FAILURE, UNSPECIFIED HF CHRONICITY, UNSPECIFIED HEART FAILURE TYPE (HCC): ICD-10-CM

## 2020-03-02 PROCEDURE — 99214 OFFICE O/P EST MOD 30 MIN: CPT | Performed by: FAMILY MEDICINE

## 2020-03-02 NOTE — PROGRESS NOTES
Subjective   Any Dozier is a 85 y.o. female who resides at [facility] who is here today for follow-up on CHF and right hip pain.  Her hip pain comes and goes and it is more in the joint, not laterally.  Is not sore to touch.  It bothers her when she tries to move.  Today it is not painful but some days limits her from walking it hurts so bad.    She needs some new diabetic shoes.  She does have some deformities in the foot a lot of pain tingling and numbness related to diabetic neuropathy.    CHF symptoms have been stable.  She has chronic edema of the legs.  She has actually lost 5 pounds since her last visit.    Congestive Heart Failure   Associated symptoms include edema, fatigue and orthopnea. Pertinent negatives include no chest pain, palpitations, shortness of breath or unexpected weight change. The symptoms have been stable. Compliance with total regimen is %. Compliance problems include adherence to exercise.  Compliance with diet is 51-75%. Compliance with exercise is 51-75%. Compliance with medications is %.      The following portions of the patient's history were reviewed and updated as appropriate: allergies, current medications, past family history, past medical history, past social history, past surgical history and problem list.    Review of Systems   Constitutional: Positive for fatigue. Negative for activity change, appetite change, chills, diaphoresis, fever and unexpected weight change.   HENT: Negative for congestion, dental problem, drooling, ear discharge, facial swelling, hearing loss, mouth sores, nosebleeds, rhinorrhea, sore throat, trouble swallowing and voice change.    Eyes: Positive for visual disturbance. Negative for photophobia, pain, discharge and redness.   Respiratory: Negative for apnea, cough, choking, chest tightness, shortness of breath and wheezing.    Cardiovascular: Negative for chest pain, palpitations and leg swelling.   Gastrointestinal:  "Negative for abdominal distention, anal bleeding, blood in stool, constipation, diarrhea, nausea and vomiting.   Endocrine: Negative for polyphagia and polyuria.   Genitourinary: Negative for decreased urine volume, difficulty urinating, dysuria, enuresis and frequency.   Musculoskeletal: Positive for arthralgias, back pain, gait problem, joint swelling and myalgias.   Skin: Negative for color change, pallor, rash and wound.   Allergic/Immunologic: Negative for immunocompromised state.   Neurological: Positive for weakness. Negative for dizziness, tremors, seizures, syncope, facial asymmetry, speech difficulty and headaches.   Hematological: Negative for adenopathy. Does not bruise/bleed easily.   Psychiatric/Behavioral: Negative for agitation, behavioral problems, confusion, dysphoric mood, hallucinations and sleep disturbance. The patient is not nervous/anxious and is not hyperactive.    All other systems reviewed and are negative.      Objective    Vitals:    03/02/20 1301   BP: 142/86   Weight: 79.4 kg (175 lb)   Height: 162.6 cm (64\")   PainSc:   8   PainLoc: Hip     Body mass index is 30.04 kg/m².    Physical Exam   Constitutional: She is oriented to person, place, and time. She appears well-nourished. No distress.   HENT:   Head: Normocephalic and atraumatic.   Nose: Nose normal.   Mouth/Throat: Oropharynx is clear and moist.   Eyes: Pupils are equal, round, and reactive to light. Right eye exhibits no discharge. Left eye exhibits no discharge. No scleral icterus.   Neck: Normal range of motion. Neck supple. No tracheal deviation present. No thyromegaly present.   Cardiovascular: Normal rate, regular rhythm and intact distal pulses. Exam reveals no friction rub.   No murmur heard.  Abdominal: Soft. Bowel sounds are normal. She exhibits no distension and no mass. There is no tenderness. There is no rebound and no guarding.   Musculoskeletal: She exhibits edema. She exhibits no tenderness or deformity.   2+ " edema right lower extremity, 1+ left   Neurological: She is alert and oriented to person, place, and time. No cranial nerve deficit.   Significantly diminished sensation in the feet and in the toes and on the plantar surface.  She has widening of the joint space between the first and second toes, some deformity in the toes, and thick hypertrophic nails.  She has marked swelling, 2+ edema of the pretibial area ankle and into the dorsal foot   Skin: Skin is warm and dry. She is not diaphoretic.   Psychiatric: She has a normal mood and affect. Her behavior is normal.   Nursing note and vitals reviewed.      Assessment/Plan   Any was seen today for congestive heart failure.    Diagnoses and all orders for this visit:    Type 2 diabetes mellitus without complication, without long-term current use of insulin (CMS/[facility])    Congestive heart failure, unspecified HF chronicity, unspecified heart failure type (CMS/HCC)    Right hip pain    Diabetic peripheral neuropathy (CMS/HCC)    Continue with current medications for congestive heart failure    We will continue to follow labs    We will get diabetic shoes    Continue with current regimen for arthritis pain.  Discussed an x-ray but she does not wish to do anything different right now.            This document has been electronically signed by Payal Zheng MD on March 2, 2020 1:34 PM

## 2020-04-07 RX ORDER — AZITHROMYCIN 250 MG/1
TABLET, FILM COATED ORAL
Qty: 6 TABLET | Refills: 0 | Status: SHIPPED | OUTPATIENT
Start: 2020-04-07 | End: 2020-04-12

## 2020-07-30 RX ORDER — SERTRALINE HYDROCHLORIDE 25 MG/1
25 TABLET, FILM COATED ORAL DAILY
Qty: 30 TABLET | Refills: 5 | Status: SHIPPED | OUTPATIENT
Start: 2020-07-30